# Patient Record
Sex: FEMALE | URBAN - METROPOLITAN AREA
[De-identification: names, ages, dates, MRNs, and addresses within clinical notes are randomized per-mention and may not be internally consistent; named-entity substitution may affect disease eponyms.]

---

## 2023-03-11 PROBLEM — F17.200 TOBACCO USE DISORDER: Status: ACTIVE | Noted: 2023-03-11

## 2023-03-11 PROBLEM — E78.5 HLD (HYPERLIPIDEMIA): Status: ACTIVE | Noted: 2023-03-11

## 2023-03-11 PROBLEM — F20.9 SCHIZOPHRENIA (MULTI): Status: ACTIVE | Noted: 2023-03-11

## 2023-03-11 PROBLEM — R63.4 WEIGHT LOSS: Status: ACTIVE | Noted: 2023-03-11

## 2023-03-11 PROBLEM — I10 HYPERTENSION: Status: ACTIVE | Noted: 2023-03-11

## 2023-03-11 PROBLEM — E88.09 HYPOALBUMINEMIA: Status: ACTIVE | Noted: 2023-03-11

## 2023-03-11 PROBLEM — F69 BEHAVIOR PROBLEM, ADULT: Status: ACTIVE | Noted: 2023-03-11

## 2023-03-11 PROBLEM — G24.01 NEUROLEPTIC-INDUCED TARDIVE DYSKINESIA: Status: ACTIVE | Noted: 2023-03-11

## 2023-03-11 PROBLEM — E53.8 VITAMIN B12 DEFICIENCY: Status: ACTIVE | Noted: 2023-03-11

## 2023-03-11 PROBLEM — J44.9 CHRONIC OBSTRUCTIVE AIRWAY DISEASE (MULTI): Status: ACTIVE | Noted: 2023-03-11

## 2023-03-11 PROBLEM — T43.505A NEUROLEPTIC-INDUCED TARDIVE DYSKINESIA: Status: ACTIVE | Noted: 2023-03-11

## 2023-03-11 PROBLEM — E55.9 VITAMIN D DEFICIENCY: Status: ACTIVE | Noted: 2023-03-11

## 2023-03-11 PROBLEM — R62.7 ADULT FAILURE TO THRIVE: Status: ACTIVE | Noted: 2023-03-11

## 2023-03-11 PROBLEM — I47.9 TACHYCARDIA, PAROXYSMAL (MULTI): Status: ACTIVE | Noted: 2023-03-11

## 2023-03-11 PROBLEM — K58.9 IBS (IRRITABLE BOWEL SYNDROME): Status: ACTIVE | Noted: 2023-03-11

## 2023-04-03 ENCOUNTER — NURSING HOME VISIT (OUTPATIENT)
Dept: POST ACUTE CARE | Facility: EXTERNAL LOCATION | Age: 78
End: 2023-04-03
Payer: COMMERCIAL

## 2023-04-03 VITALS
TEMPERATURE: 97.4 F | HEART RATE: 76 BPM | RESPIRATION RATE: 18 BRPM | OXYGEN SATURATION: 100 % | WEIGHT: 136 LBS | SYSTOLIC BLOOD PRESSURE: 126 MMHG | DIASTOLIC BLOOD PRESSURE: 70 MMHG | BODY MASS INDEX: 15.72 KG/M2

## 2023-04-03 DIAGNOSIS — G24.01 NEUROLEPTIC-INDUCED TARDIVE DYSKINESIA: Primary | ICD-10-CM

## 2023-04-03 DIAGNOSIS — I10 PRIMARY HYPERTENSION: ICD-10-CM

## 2023-04-03 DIAGNOSIS — F17.200 TOBACCO USE DISORDER: ICD-10-CM

## 2023-04-03 DIAGNOSIS — F20.9 SCHIZOPHRENIA, UNSPECIFIED TYPE (MULTI): ICD-10-CM

## 2023-04-03 DIAGNOSIS — J43.8 OTHER EMPHYSEMA (MULTI): ICD-10-CM

## 2023-04-03 DIAGNOSIS — T43.505A NEUROLEPTIC-INDUCED TARDIVE DYSKINESIA: Primary | ICD-10-CM

## 2023-04-03 PROCEDURE — 99309 SBSQ NF CARE MODERATE MDM 30: CPT | Performed by: NURSE PRACTITIONER

## 2023-04-03 NOTE — LETTER
Patient: Lupe Serrato  : 1945    Encounter Date: 2023    Subjective   Lupe Serrato is a 77 y.o. female Here for routine monthly visit.    HPI There are no new problems and multiple health problems have been reviewed.  The course has been unchanged.  The patient  is oriented..   There are no family members present during time of visit.    she is sitting up at side of bed,  denies any complaints when asked.  Eating and drinking well.   No concerns per staff.       Review of Systems   Constitutional:  Negative for chills, fatigue and fever.   Respiratory:  Negative for cough and shortness of breath.    Cardiovascular:  Negative for chest pain and palpitations.   Gastrointestinal:  Negative for abdominal pain, constipation, diarrhea, nausea and vomiting.   Genitourinary:  Negative for difficulty urinating.       Objective   /70   Pulse 76   Temp 36.3 °C (97.4 °F)   Resp 18   Wt 61.7 kg (136 lb)   SpO2 100%   BMI 15.72 kg/m²     Physical Exam  Constitutional:       General: She is not in acute distress.     Comments: thin   HENT:      Head: Normocephalic and atraumatic.   Eyes:      Conjunctiva/sclera: Conjunctivae normal.   Cardiovascular:      Rate and Rhythm: Normal rate and regular rhythm.   Pulmonary:      Effort: Pulmonary effort is normal. No respiratory distress.      Breath sounds: Normal breath sounds.      Comments: Diminished t/o  Abdominal:      General: Bowel sounds are normal. There is no distension.      Palpations: Abdomen is soft.      Tenderness: There is no abdominal tenderness.   Musculoskeletal:         General: Normal range of motion.      Right lower leg: No edema.      Left lower leg: No edema.   Skin:     General: Skin is warm and dry.   Neurological:      General: No focal deficit present.      Mental Status: She is alert and oriented to person, place, and time.   Psychiatric:         Mood and Affect: Mood normal.         Behavior: Behavior normal.          Assessment/Plan   Problem List Items Addressed This Visit          Nervous    Neuroleptic-induced tardive dyskinesia - Primary     Follow up with psychiatry as scheduled.              Respiratory    Chronic obstructive airway disease (CMS/HCC)     no symptoms of acute exacerbation.  staff to monitor and notify for any changes.              Circulatory    Hypertension     Not currently on meds, continue to montior and initiate meds  based on clinical course            Other    Schizophrenia (CMS/HCC)     Managed by psychiatry in the facility         Tobacco use disorder     Declines smoking cessation strategies          continue with supportive and restorative care  Shelia has requested no labs for monitoring purposes which will be honored.       Electronically Signed By: BETH Victor   4/7/23  6:33 PM

## 2023-04-03 NOTE — PROGRESS NOTES
Subjective   Lupe Serrato is a 77 y.o. female Here for routine monthly visit.    HPI There are no new problems and multiple health problems have been reviewed.  The course has been unchanged.  The patient  is oriented..   There are no family members present during time of visit.    she is sitting up at side of bed,  denies any complaints when asked.  Eating and drinking well.   No concerns per staff.       Review of Systems   Constitutional:  Negative for chills, fatigue and fever.   Respiratory:  Negative for cough and shortness of breath.    Cardiovascular:  Negative for chest pain and palpitations.   Gastrointestinal:  Negative for abdominal pain, constipation, diarrhea, nausea and vomiting.   Genitourinary:  Negative for difficulty urinating.       Objective   /70   Pulse 76   Temp 36.3 °C (97.4 °F)   Resp 18   Wt 61.7 kg (136 lb)   SpO2 100%   BMI 15.72 kg/m²     Physical Exam  Constitutional:       General: She is not in acute distress.     Comments: thin   HENT:      Head: Normocephalic and atraumatic.   Eyes:      Conjunctiva/sclera: Conjunctivae normal.   Cardiovascular:      Rate and Rhythm: Normal rate and regular rhythm.   Pulmonary:      Effort: Pulmonary effort is normal. No respiratory distress.      Breath sounds: Normal breath sounds.      Comments: Diminished t/o  Abdominal:      General: Bowel sounds are normal. There is no distension.      Palpations: Abdomen is soft.      Tenderness: There is no abdominal tenderness.   Musculoskeletal:         General: Normal range of motion.      Right lower leg: No edema.      Left lower leg: No edema.   Skin:     General: Skin is warm and dry.   Neurological:      General: No focal deficit present.      Mental Status: She is alert and oriented to person, place, and time.   Psychiatric:         Mood and Affect: Mood normal.         Behavior: Behavior normal.         Assessment/Plan   Problem List Items Addressed This Visit          Nervous     Neuroleptic-induced tardive dyskinesia - Primary     Follow up with psychiatry as scheduled.              Respiratory    Chronic obstructive airway disease (CMS/HCC)     no symptoms of acute exacerbation.  staff to monitor and notify for any changes.              Circulatory    Hypertension     Not currently on meds, continue to montior and initiate meds  based on clinical course            Other    Schizophrenia (CMS/HCC)     Managed by psychiatry in the facility         Tobacco use disorder     Declines smoking cessation strategies          continue with supportive and restorative care  Shelia has requested no labs for monitoring purposes which will be honored.

## 2023-04-07 ASSESSMENT — ENCOUNTER SYMPTOMS
PALPITATIONS: 0
DIARRHEA: 0
SHORTNESS OF BREATH: 0
NAUSEA: 0
ABDOMINAL PAIN: 0
FEVER: 0
DIFFICULTY URINATING: 0
FATIGUE: 0
VOMITING: 0
CHILLS: 0
CONSTIPATION: 0
COUGH: 0

## 2023-04-27 ENCOUNTER — NURSING HOME VISIT (OUTPATIENT)
Dept: POST ACUTE CARE | Facility: EXTERNAL LOCATION | Age: 78
End: 2023-04-27
Payer: COMMERCIAL

## 2023-04-27 DIAGNOSIS — F20.9 SCHIZOPHRENIA, UNSPECIFIED TYPE (MULTI): ICD-10-CM

## 2023-04-27 DIAGNOSIS — G24.01 NEUROLEPTIC-INDUCED TARDIVE DYSKINESIA: Primary | ICD-10-CM

## 2023-04-27 DIAGNOSIS — F17.200 TOBACCO USE DISORDER: ICD-10-CM

## 2023-04-27 DIAGNOSIS — T43.505A NEUROLEPTIC-INDUCED TARDIVE DYSKINESIA: Primary | ICD-10-CM

## 2023-04-27 PROCEDURE — 99308 SBSQ NF CARE LOW MDM 20: CPT | Performed by: INTERNAL MEDICINE

## 2023-04-27 NOTE — LETTER
Patient: Lupe Serrato  : 1945    Encounter Date: 2023    Subjective  Patient ID: Lupe Serrato is a 77 y.o. female who is long term resident being seen and evaluated for multiple medical problems.    HPI   This 77-year-old female patient is resting comfortably in her bed in no distress.  She has no complaints of pain or shortness of breath me at this time.  Nursing has no new adverse events reported.    Current high risk medication:  Benztropine  Potassium  Seroquel    Laboratory examinations were ordered by the psychiatric nurse practitioner but the patient probably refused them as they appear not to been completed.    Review of Systems   Constitutional:  Negative for chills, diaphoresis and fever.   Respiratory:  Negative for cough and shortness of breath.    Cardiovascular:  Negative for chest pain and leg swelling.   Gastrointestinal:  Negative for constipation, diarrhea, nausea and vomiting.   Musculoskeletal:  Negative for joint swelling and myalgias.       Objective  /70   Pulse 82   Temp 36.1 °C (96.9 °F)   Resp 18   Wt 60.3 kg (133 lb)   SpO2 97%   BMI 15.37 kg/m²     Physical Exam  Vitals reviewed.   Constitutional:       General: She is not in acute distress.     Appearance: She is not ill-appearing.   Cardiovascular:      Rate and Rhythm: Normal rate and regular rhythm.      Pulses: Normal pulses.      Heart sounds:      No gallop.   Pulmonary:      Breath sounds: Normal breath sounds. No wheezing, rhonchi or rales.   Abdominal:      General: Abdomen is flat. Bowel sounds are normal.      Palpations: Abdomen is soft.      Tenderness: There is no guarding or rebound.   Musculoskeletal:      Right lower leg: No edema.      Left lower leg: No edema.   Neurological:      Comments: The patient has more noticeable bilateral hand resting tremor consistent with extraparametal symptoms.         Assessment/Plan  Problem List Items Addressed This Visit          Nervous     Neuroleptic-induced tardive dyskinesia - Primary       Other    Schizophrenia (CMS/HCC)    Tobacco use disorder       A.  We will continue with restorative and supportive care as the patient tolerates    B.  Laboratory examinations will be deferred as the patient has categorically refused all laboratory and other healthcare testing.    C.  Appreciate the input of psychiatry and the cares at this patient with chronic schizophrenia and apparently worsening extraparametal symptoms    D.  The patient's prognosis is guarded.      Electronically Signed By: Devonte Mcclure MD   5/1/23  5:01 PM

## 2023-04-30 VITALS
DIASTOLIC BLOOD PRESSURE: 70 MMHG | BODY MASS INDEX: 15.37 KG/M2 | OXYGEN SATURATION: 97 % | HEART RATE: 82 BPM | WEIGHT: 133 LBS | RESPIRATION RATE: 18 BRPM | TEMPERATURE: 96.9 F | SYSTOLIC BLOOD PRESSURE: 124 MMHG

## 2023-04-30 NOTE — PROGRESS NOTES
Subjective   Patient ID: Lupe Serrato is a 77 y.o. female who is long term resident being seen and evaluated for multiple medical problems.    HPI   This 77-year-old female patient is resting comfortably in her bed in no distress.  She has no complaints of pain or shortness of breath me at this time.  Nursing has no new adverse events reported.    Current high risk medication:  Benztropine  Potassium  Seroquel    Laboratory examinations were ordered by the psychiatric nurse practitioner but the patient probably refused them as they appear not to been completed.    Review of Systems   Constitutional:  Negative for chills, diaphoresis and fever.   Respiratory:  Negative for cough and shortness of breath.    Cardiovascular:  Negative for chest pain and leg swelling.   Gastrointestinal:  Negative for constipation, diarrhea, nausea and vomiting.   Musculoskeletal:  Negative for joint swelling and myalgias.       Objective   /70   Pulse 82   Temp 36.1 °C (96.9 °F)   Resp 18   Wt 60.3 kg (133 lb)   SpO2 97%   BMI 15.37 kg/m²     Physical Exam  Vitals reviewed.   Constitutional:       General: She is not in acute distress.     Appearance: She is not ill-appearing.   Cardiovascular:      Rate and Rhythm: Normal rate and regular rhythm.      Pulses: Normal pulses.      Heart sounds:      No gallop.   Pulmonary:      Breath sounds: Normal breath sounds. No wheezing, rhonchi or rales.   Abdominal:      General: Abdomen is flat. Bowel sounds are normal.      Palpations: Abdomen is soft.      Tenderness: There is no guarding or rebound.   Musculoskeletal:      Right lower leg: No edema.      Left lower leg: No edema.   Neurological:      Comments: The patient has more noticeable bilateral hand resting tremor consistent with extraparametal symptoms.         Assessment/Plan   Problem List Items Addressed This Visit          Nervous    Neuroleptic-induced tardive dyskinesia - Primary       Other    Schizophrenia  (CMS/[facility])    Tobacco use disorder       A.  We will continue with restorative and supportive care as the patient tolerates    B.  Laboratory examinations will be deferred as the patient has categorically refused all laboratory and other healthcare testing.    C.  Appreciate the input of psychiatry and the cares at this patient with chronic schizophrenia and apparently worsening extraparametal symptoms    D.  The patient's prognosis is guarded.

## 2023-05-01 ASSESSMENT — ENCOUNTER SYMPTOMS
DIARRHEA: 0
VOMITING: 0
DIAPHORESIS: 0
SHORTNESS OF BREATH: 0
JOINT SWELLING: 0
CHILLS: 0
NAUSEA: 0
FEVER: 0
CONSTIPATION: 0
MYALGIAS: 0
COUGH: 0

## 2023-06-06 ENCOUNTER — NURSING HOME VISIT (OUTPATIENT)
Dept: POST ACUTE CARE | Facility: EXTERNAL LOCATION | Age: 78
End: 2023-06-06
Payer: COMMERCIAL

## 2023-06-06 VITALS
RESPIRATION RATE: 18 BRPM | DIASTOLIC BLOOD PRESSURE: 67 MMHG | HEART RATE: 76 BPM | BODY MASS INDEX: 15.72 KG/M2 | WEIGHT: 136 LBS | SYSTOLIC BLOOD PRESSURE: 121 MMHG | TEMPERATURE: 97.8 F | OXYGEN SATURATION: 97 %

## 2023-06-06 DIAGNOSIS — T43.505A NEUROLEPTIC-INDUCED TARDIVE DYSKINESIA: ICD-10-CM

## 2023-06-06 DIAGNOSIS — J43.8 OTHER EMPHYSEMA (MULTI): ICD-10-CM

## 2023-06-06 DIAGNOSIS — F17.200 TOBACCO USE DISORDER: ICD-10-CM

## 2023-06-06 DIAGNOSIS — F20.9 SCHIZOPHRENIA, UNSPECIFIED TYPE (MULTI): ICD-10-CM

## 2023-06-06 DIAGNOSIS — G24.01 NEUROLEPTIC-INDUCED TARDIVE DYSKINESIA: ICD-10-CM

## 2023-06-06 DIAGNOSIS — I10 PRIMARY HYPERTENSION: Primary | ICD-10-CM

## 2023-06-06 PROCEDURE — 99308 SBSQ NF CARE LOW MDM 20: CPT | Performed by: INTERNAL MEDICINE

## 2023-06-06 NOTE — LETTER
Patient: Lupe Serrato  : 1945    Encounter Date: 2023    Subjective  Patient ID: Lupe Serrato is a 77 y.o. female who is long term resident being seen and evaluated for multiple medical problems.    HPI   This 77-year-old female patient is up ambulatory in her room in no distress.  She has no complaints of shortness of breath or pain for me.  Nursing has no new adverse events reported to me.  Of note the patient did agree to get lab draw which is quite unusual.    Current high risk medication:  Benztropine  Potassium chloride  Seroquel    Laboratory examination from 2023:  Potassium 4.3  Bicarbonate 26  Creatinine 0.5  Abdomen 3.0  Liver injury test normal  Hemoglobin 11.6  Folic acid 10.2  Vitamin B12 410  Vitamin D 47    Review of Systems   Constitutional:  Negative for chills, diaphoresis and fever.   Respiratory:  Negative for cough and shortness of breath.    Cardiovascular:  Negative for chest pain and leg swelling.   Gastrointestinal:  Negative for constipation, diarrhea, nausea and vomiting.   Musculoskeletal:  Negative for joint swelling and myalgias.       Objective  /67   Pulse 76   Temp 36.6 °C (97.8 °F)   Resp 18   Wt 61.7 kg (136 lb)   SpO2 97%   BMI 15.72 kg/m²     Physical Exam  Vitals reviewed.   Constitutional:       General: She is not in acute distress.     Appearance: She is not ill-appearing.   Cardiovascular:      Rate and Rhythm: Normal rate and regular rhythm.      Pulses: Normal pulses.      Heart sounds:      No gallop.   Pulmonary:      Breath sounds: Normal breath sounds. No wheezing, rhonchi or rales.   Abdominal:      General: Abdomen is flat. Bowel sounds are normal.      Palpations: Abdomen is soft.      Tenderness: There is no guarding or rebound.   Musculoskeletal:      Right lower leg: No edema.      Left lower leg: No edema.   Neurological:      Comments: The patient has more noticeable bilateral hand resting tremor consistent with  extraparametal symptoms.         Assessment/Plan  Problem List Items Addressed This Visit          Nervous    Neuroleptic-induced tardive dyskinesia       Respiratory    Chronic obstructive airway disease (CMS/HCC)       Circulatory    Hypertension - Primary       Other    Schizophrenia (CMS/HCC)    Tobacco use disorder       A.  We will continue with restorative and supportive care as patient tolerates    B.  Laboratory examinations will next be due in October 2023 or as needed    C.  The patient's prognosis is fair-2-guarded.      Electronically Signed By: Devonte Mcclure MD   6/7/23  6:11 PM

## 2023-06-06 NOTE — PROGRESS NOTES
Subjective   Patient ID: Lupe Serrato is a 77 y.o. female who is long term resident being seen and evaluated for multiple medical problems.    HPI   This 77-year-old female patient is up ambulatory in her room in no distress.  She has no complaints of shortness of breath or pain for me.  Nursing has no new adverse events reported to me.  Of note the patient did agree to get lab draw which is quite unusual.    Current high risk medication:  Benztropine  Potassium chloride  Seroquel    Laboratory examination from April 2023:  Potassium 4.3  Bicarbonate 26  Creatinine 0.5  Abdomen 3.0  Liver injury test normal  Hemoglobin 11.6  Folic acid 10.2  Vitamin B12 410  Vitamin D 47    Review of Systems   Constitutional:  Negative for chills, diaphoresis and fever.   Respiratory:  Negative for cough and shortness of breath.    Cardiovascular:  Negative for chest pain and leg swelling.   Gastrointestinal:  Negative for constipation, diarrhea, nausea and vomiting.   Musculoskeletal:  Negative for joint swelling and myalgias.       Objective   /67   Pulse 76   Temp 36.6 °C (97.8 °F)   Resp 18   Wt 61.7 kg (136 lb)   SpO2 97%   BMI 15.72 kg/m²     Physical Exam  Vitals reviewed.   Constitutional:       General: She is not in acute distress.     Appearance: She is not ill-appearing.   Cardiovascular:      Rate and Rhythm: Normal rate and regular rhythm.      Pulses: Normal pulses.      Heart sounds:      No gallop.   Pulmonary:      Breath sounds: Normal breath sounds. No wheezing, rhonchi or rales.   Abdominal:      General: Abdomen is flat. Bowel sounds are normal.      Palpations: Abdomen is soft.      Tenderness: There is no guarding or rebound.   Musculoskeletal:      Right lower leg: No edema.      Left lower leg: No edema.   Neurological:      Comments: The patient has more noticeable bilateral hand resting tremor consistent with extraparametal symptoms.         Assessment/Plan   Problem List Items Addressed  This Visit          Nervous    Neuroleptic-induced tardive dyskinesia       Respiratory    Chronic obstructive airway disease (CMS/HCC)       Circulatory    Hypertension - Primary       Other    Schizophrenia (CMS/HCC)    Tobacco use disorder       A.  We will continue with restorative and supportive care as patient tolerates    B.  Laboratory examinations will next be due in October 2023 or as needed    C.  The patient's prognosis is fair-2-guarded.

## 2023-06-07 ASSESSMENT — ENCOUNTER SYMPTOMS
CONSTIPATION: 0
DIARRHEA: 0
NAUSEA: 0
MYALGIAS: 0
SHORTNESS OF BREATH: 0
JOINT SWELLING: 0
CHILLS: 0
VOMITING: 0
COUGH: 0
FEVER: 0
DIAPHORESIS: 0

## 2023-06-22 ENCOUNTER — NURSING HOME VISIT (OUTPATIENT)
Dept: POST ACUTE CARE | Facility: EXTERNAL LOCATION | Age: 78
End: 2023-06-22
Payer: COMMERCIAL

## 2023-06-22 VITALS
SYSTOLIC BLOOD PRESSURE: 125 MMHG | HEART RATE: 75 BPM | WEIGHT: 136 LBS | BODY MASS INDEX: 15.72 KG/M2 | OXYGEN SATURATION: 97 % | DIASTOLIC BLOOD PRESSURE: 65 MMHG | TEMPERATURE: 97.4 F | RESPIRATION RATE: 18 BRPM

## 2023-06-22 DIAGNOSIS — F20.9 SCHIZOPHRENIA, UNSPECIFIED TYPE (MULTI): ICD-10-CM

## 2023-06-22 DIAGNOSIS — I10 PRIMARY HYPERTENSION: ICD-10-CM

## 2023-06-22 DIAGNOSIS — T43.505A NEUROLEPTIC-INDUCED TARDIVE DYSKINESIA: ICD-10-CM

## 2023-06-22 DIAGNOSIS — J43.8 OTHER EMPHYSEMA (MULTI): Primary | ICD-10-CM

## 2023-06-22 DIAGNOSIS — G24.01 NEUROLEPTIC-INDUCED TARDIVE DYSKINESIA: ICD-10-CM

## 2023-06-22 PROCEDURE — 99309 SBSQ NF CARE MODERATE MDM 30: CPT | Performed by: NURSE PRACTITIONER

## 2023-06-22 ASSESSMENT — ENCOUNTER SYMPTOMS
DIARRHEA: 0
PALPITATIONS: 0
VOMITING: 0
NAUSEA: 0
SHORTNESS OF BREATH: 0
CHILLS: 0
DIFFICULTY URINATING: 0
ABDOMINAL PAIN: 0
COUGH: 0
FEVER: 0
FATIGUE: 0
CONSTIPATION: 0

## 2023-06-22 NOTE — PROGRESS NOTES
Subjective   Lupe Serrato is a 77 y.o. female Here for routine monthly visit.    HPI There are no new problems and multiple health problems have been reviewed.  The course has been unchanged.  The patient  is oriented.  There are no family members present during time of visit.    she is sitting up at side of bed,  denies any complaints when asked.  Eating and drinking well.   No concerns per staff.       Review of Systems   Constitutional:  Negative for chills, fatigue and fever.   Respiratory:  Negative for cough and shortness of breath.    Cardiovascular:  Negative for chest pain and palpitations.   Gastrointestinal:  Negative for abdominal pain, constipation, diarrhea, nausea and vomiting.   Genitourinary:  Negative for difficulty urinating.       Objective   /65   Pulse 75   Temp 36.3 °C (97.4 °F)   Resp 18   Wt 61.7 kg (136 lb)   SpO2 97%   BMI 15.72 kg/m²     Physical Exam  Constitutional:       General: She is not in acute distress.     Comments: thin   HENT:      Head: Normocephalic and atraumatic.   Eyes:      Conjunctiva/sclera: Conjunctivae normal.   Cardiovascular:      Rate and Rhythm: Normal rate and regular rhythm.   Pulmonary:      Effort: Pulmonary effort is normal. No respiratory distress.      Breath sounds: Normal breath sounds.      Comments: Diminished t/o  Abdominal:      General: Bowel sounds are normal. There is no distension.      Palpations: Abdomen is soft.      Tenderness: There is no abdominal tenderness.   Musculoskeletal:         General: Normal range of motion.      Right lower leg: No edema.      Left lower leg: No edema.   Skin:     General: Skin is warm and dry.   Neurological:      General: No focal deficit present.      Mental Status: She is alert and oriented to person, place, and time.   Psychiatric:         Mood and Affect: Mood normal.         Behavior: Behavior normal.         Assessment/Plan   Problem List Items Addressed This Visit          Nervous     Neuroleptic-induced tardive dyskinesia     Follow up with psychiatry as scheduled.              Respiratory    Chronic obstructive airway disease (CMS/HCC) - Primary     no symptoms of acute exacerbation.  staff to monitor and notify for any changes.              Circulatory    Hypertension     Not currently on meds, continue to montior and initiate meds  based on clinical course            Other    Schizophrenia (CMS/HCC)     Managed by psychiatry in the facility          continue with supportive and restorative care  Shelia agreed to labs 4/23 that were ordered through psychiatry, she has previously declined all labs, if she decides to continue to have labs they will be due 10/23

## 2023-06-22 NOTE — LETTER
Patient: Lupe Serrato  : 1945    Encounter Date: 2023    Subjective  Lupe Serrato is a 77 y.o. female Here for routine monthly visit.    HPI There are no new problems and multiple health problems have been reviewed.  The course has been unchanged.  The patient  is oriented.  There are no family members present during time of visit.    she is sitting up at side of bed,  denies any complaints when asked.  Eating and drinking well.   No concerns per staff.       Review of Systems   Constitutional:  Negative for chills, fatigue and fever.   Respiratory:  Negative for cough and shortness of breath.    Cardiovascular:  Negative for chest pain and palpitations.   Gastrointestinal:  Negative for abdominal pain, constipation, diarrhea, nausea and vomiting.   Genitourinary:  Negative for difficulty urinating.       Objective  /65   Pulse 75   Temp 36.3 °C (97.4 °F)   Resp 18   Wt 61.7 kg (136 lb)   SpO2 97%   BMI 15.72 kg/m²     Physical Exam  Constitutional:       General: She is not in acute distress.     Comments: thin   HENT:      Head: Normocephalic and atraumatic.   Eyes:      Conjunctiva/sclera: Conjunctivae normal.   Cardiovascular:      Rate and Rhythm: Normal rate and regular rhythm.   Pulmonary:      Effort: Pulmonary effort is normal. No respiratory distress.      Breath sounds: Normal breath sounds.      Comments: Diminished t/o  Abdominal:      General: Bowel sounds are normal. There is no distension.      Palpations: Abdomen is soft.      Tenderness: There is no abdominal tenderness.   Musculoskeletal:         General: Normal range of motion.      Right lower leg: No edema.      Left lower leg: No edema.   Skin:     General: Skin is warm and dry.   Neurological:      General: No focal deficit present.      Mental Status: She is alert and oriented to person, place, and time.   Psychiatric:         Mood and Affect: Mood normal.         Behavior: Behavior normal.          Assessment/Plan  Problem List Items Addressed This Visit          Nervous    Neuroleptic-induced tardive dyskinesia     Follow up with psychiatry as scheduled.              Respiratory    Chronic obstructive airway disease (CMS/HCC) - Primary     no symptoms of acute exacerbation.  staff to monitor and notify for any changes.              Circulatory    Hypertension     Not currently on meds, continue to montior and initiate meds  based on clinical course            Other    Schizophrenia (CMS/HCC)     Managed by psychiatry in the facility          continue with supportive and restorative care  Shelia agreed to labs 4/23 that were ordered through psychiatry, she has previously declined all labs, if she decides to continue to have labs they will be due 10/23      Electronically Signed By: BETH Victor   6/22/23  4:21 PM

## 2023-07-06 ENCOUNTER — NURSING HOME VISIT (OUTPATIENT)
Dept: POST ACUTE CARE | Facility: EXTERNAL LOCATION | Age: 78
End: 2023-07-06
Payer: COMMERCIAL

## 2023-07-06 VITALS
RESPIRATION RATE: 18 BRPM | HEART RATE: 81 BPM | BODY MASS INDEX: 15.37 KG/M2 | TEMPERATURE: 97.4 F | WEIGHT: 133 LBS | SYSTOLIC BLOOD PRESSURE: 136 MMHG | DIASTOLIC BLOOD PRESSURE: 86 MMHG | OXYGEN SATURATION: 97 %

## 2023-07-06 DIAGNOSIS — J43.8 OTHER EMPHYSEMA (MULTI): Primary | ICD-10-CM

## 2023-07-06 DIAGNOSIS — G24.01 NEUROLEPTIC-INDUCED TARDIVE DYSKINESIA: ICD-10-CM

## 2023-07-06 DIAGNOSIS — F20.9 SCHIZOPHRENIA, UNSPECIFIED TYPE (MULTI): ICD-10-CM

## 2023-07-06 DIAGNOSIS — I10 PRIMARY HYPERTENSION: ICD-10-CM

## 2023-07-06 DIAGNOSIS — F17.200 TOBACCO USE DISORDER: ICD-10-CM

## 2023-07-06 DIAGNOSIS — T43.505A NEUROLEPTIC-INDUCED TARDIVE DYSKINESIA: ICD-10-CM

## 2023-07-06 PROCEDURE — 99308 SBSQ NF CARE LOW MDM 20: CPT | Performed by: INTERNAL MEDICINE

## 2023-07-06 NOTE — PROGRESS NOTES
Subjective   Patient ID: Lupe Serrato is a 77 y.o. female who is long term resident being seen and evaluated for multiple medical problems.    HPI   This 77-year-old female patient is resting comfortably in her room in no distress.  She has no complaints of pain or shortness of breath me at this time.  Nursing has no new adverse events reported to me.    Current high risk medication:  Benztropine  Potassium  Seroquel    Laboratory examination from April 2023 of been reviewed in detail by me.    Review of Systems   Constitutional:  Negative for chills, fatigue and fever.   Respiratory:  Negative for cough and shortness of breath.    Cardiovascular:  Negative for chest pain and palpitations.   Gastrointestinal:  Negative for abdominal pain, constipation, diarrhea, nausea and vomiting.   Genitourinary:  Negative for difficulty urinating.       Objective   /86   Pulse 81   Temp 36.3 °C (97.4 °F)   Resp 18   Wt 60.3 kg (133 lb)   SpO2 97%   BMI 15.37 kg/m²     Physical Exam  Constitutional:       General: She is not in acute distress.     Comments: thin   HENT:      Head: Normocephalic and atraumatic.      Mouth/Throat:      Mouth: Mucous membranes are moist.   Eyes:      Conjunctiva/sclera: Conjunctivae normal.   Cardiovascular:      Rate and Rhythm: Normal rate and regular rhythm.   Pulmonary:      Effort: Pulmonary effort is normal. No respiratory distress.      Breath sounds: Normal breath sounds.      Comments: Diminished t/o  Abdominal:      General: Bowel sounds are normal. There is no distension.      Palpations: Abdomen is soft.      Tenderness: There is no abdominal tenderness.   Musculoskeletal:         General: Normal range of motion.      Right lower leg: No edema.      Left lower leg: No edema.   Skin:     General: Skin is warm and dry.   Neurological:      General: No focal deficit present.      Mental Status: She is alert and oriented to person, place, and time.   Psychiatric:         Mood  and Affect: Mood normal.         Behavior: Behavior normal.         Assessment/Plan   Problem List Items Addressed This Visit       Chronic obstructive airway disease (CMS/HCC) - Primary    Hypertension    Neuroleptic-induced tardive dyskinesia    Schizophrenia (CMS/HCC)    Tobacco use disorder     A.  We will continue with restorative and supportive care as patient tolerates    B.  Laboratory examinations will next be due in October 2023 if the patient agrees    C.  The patient's prognosis is guarded.

## 2023-07-06 NOTE — LETTER
Patient: Lupe Serrato  : 1945    Encounter Date: 2023    Subjective  Patient ID: Luep Serrato is a 77 y.o. female who is long term resident being seen and evaluated for multiple medical problems.    HPI   This 77-year-old female patient is resting comfortably in her room in no distress.  She has no complaints of pain or shortness of breath me at this time.  Nursing has no new adverse events reported to me.    Current high risk medication:  Benztropine  Potassium  Seroquel    Laboratory examination from 2023 of been reviewed in detail by me.    Review of Systems   Constitutional:  Negative for chills, fatigue and fever.   Respiratory:  Negative for cough and shortness of breath.    Cardiovascular:  Negative for chest pain and palpitations.   Gastrointestinal:  Negative for abdominal pain, constipation, diarrhea, nausea and vomiting.   Genitourinary:  Negative for difficulty urinating.       Objective  /86   Pulse 81   Temp 36.3 °C (97.4 °F)   Resp 18   Wt 60.3 kg (133 lb)   SpO2 97%   BMI 15.37 kg/m²     Physical Exam  Constitutional:       General: She is not in acute distress.     Comments: thin   HENT:      Head: Normocephalic and atraumatic.      Mouth/Throat:      Mouth: Mucous membranes are moist.   Eyes:      Conjunctiva/sclera: Conjunctivae normal.   Cardiovascular:      Rate and Rhythm: Normal rate and regular rhythm.   Pulmonary:      Effort: Pulmonary effort is normal. No respiratory distress.      Breath sounds: Normal breath sounds.      Comments: Diminished t/o  Abdominal:      General: Bowel sounds are normal. There is no distension.      Palpations: Abdomen is soft.      Tenderness: There is no abdominal tenderness.   Musculoskeletal:         General: Normal range of motion.      Right lower leg: No edema.      Left lower leg: No edema.   Skin:     General: Skin is warm and dry.   Neurological:      General: No focal deficit present.      Mental Status: She is  alert and oriented to person, place, and time.   Psychiatric:         Mood and Affect: Mood normal.         Behavior: Behavior normal.         Assessment/Plan  Problem List Items Addressed This Visit       Chronic obstructive airway disease (CMS/HCC) - Primary    Hypertension    Neuroleptic-induced tardive dyskinesia    Schizophrenia (CMS/HCC)    Tobacco use disorder     A.  We will continue with restorative and supportive care as patient tolerates    B.  Laboratory examinations will next be due in October 2023 if the patient agrees    C.  The patient's prognosis is guarded.        Electronically Signed By: Devonte Mcclure MD   7/10/23  4:11 PM

## 2023-07-10 ASSESSMENT — ENCOUNTER SYMPTOMS
COUGH: 0
DIFFICULTY URINATING: 0
CHILLS: 0
SHORTNESS OF BREATH: 0
VOMITING: 0
FEVER: 0
CONSTIPATION: 0
NAUSEA: 0
DIARRHEA: 0
ABDOMINAL PAIN: 0
PALPITATIONS: 0
FATIGUE: 0

## 2023-08-04 ENCOUNTER — NURSING HOME VISIT (OUTPATIENT)
Dept: POST ACUTE CARE | Facility: EXTERNAL LOCATION | Age: 78
End: 2023-08-04
Payer: COMMERCIAL

## 2023-08-04 VITALS
DIASTOLIC BLOOD PRESSURE: 82 MMHG | TEMPERATURE: 98.1 F | RESPIRATION RATE: 18 BRPM | BODY MASS INDEX: 15.49 KG/M2 | WEIGHT: 134 LBS | OXYGEN SATURATION: 94 % | SYSTOLIC BLOOD PRESSURE: 120 MMHG | HEART RATE: 80 BPM

## 2023-08-04 DIAGNOSIS — F20.9 SCHIZOPHRENIA, UNSPECIFIED TYPE (MULTI): ICD-10-CM

## 2023-08-04 DIAGNOSIS — G24.01 NEUROLEPTIC-INDUCED TARDIVE DYSKINESIA: ICD-10-CM

## 2023-08-04 DIAGNOSIS — T43.505A NEUROLEPTIC-INDUCED TARDIVE DYSKINESIA: ICD-10-CM

## 2023-08-04 DIAGNOSIS — J43.8 OTHER EMPHYSEMA (MULTI): ICD-10-CM

## 2023-08-04 DIAGNOSIS — I10 PRIMARY HYPERTENSION: Primary | ICD-10-CM

## 2023-08-04 DIAGNOSIS — E78.5 HYPERLIPIDEMIA, UNSPECIFIED HYPERLIPIDEMIA TYPE: ICD-10-CM

## 2023-08-04 DIAGNOSIS — F17.200 TOBACCO USE DISORDER: ICD-10-CM

## 2023-08-04 PROCEDURE — 99308 SBSQ NF CARE LOW MDM 20: CPT | Performed by: INTERNAL MEDICINE

## 2023-08-04 NOTE — PROGRESS NOTES
Subjective   Patient ID: Lupe Serrato is a 78 y.o. female who is long term resident being seen and evaluated for multiple medical problems.    HPI   This 78-year-old female patient is resting quietly in her bed in no distress.  She has no complaints as per her usual.  Nursing has no new adverse events fortunately.    Current high risk medication:  Benztropine  Seroquel    Laboratory examination from April 2023:  Potassium 4.3  Bicarbonate 26  Creatinine 0.5  Albumin 3.0  Laboratory test normal  Hemoglobin 11.6  Folic acid 10.2  Vitamin B12 410  Vitamin D 47    Review of Systems   Constitutional:  Negative for chills, fatigue and fever.   Respiratory:  Negative for cough and shortness of breath.    Cardiovascular:  Negative for chest pain and palpitations.   Gastrointestinal:  Negative for abdominal pain, constipation, diarrhea, nausea and vomiting.   Genitourinary:  Negative for difficulty urinating.       Objective   /82   Pulse 80   Temp 36.7 °C (98.1 °F)   Resp 18   Wt 60.8 kg (134 lb)   SpO2 94%   BMI 15.49 kg/m²     Physical Exam  Constitutional:       General: She is not in acute distress.     Comments: thin   HENT:      Head: Normocephalic and atraumatic.      Mouth/Throat:      Mouth: Mucous membranes are moist.   Eyes:      Conjunctiva/sclera: Conjunctivae normal.   Cardiovascular:      Rate and Rhythm: Normal rate and regular rhythm.   Pulmonary:      Effort: Pulmonary effort is normal. No respiratory distress.      Breath sounds: Normal breath sounds.      Comments: Diminished t/o  Abdominal:      General: Bowel sounds are normal. There is no distension.      Palpations: Abdomen is soft.      Tenderness: There is no abdominal tenderness.   Musculoskeletal:         General: Normal range of motion.      Right lower leg: No edema.      Left lower leg: No edema.   Skin:     General: Skin is warm and dry.   Neurological:      General: No focal deficit present.      Mental Status: She is alert  and oriented to person, place, and time.   Psychiatric:         Mood and Affect: Mood normal.         Behavior: Behavior normal.         Assessment/Plan   Problem List Items Addressed This Visit       Chronic obstructive airway disease (CMS/HCC)    HLD (hyperlipidemia)    Hypertension - Primary    Neuroleptic-induced tardive dyskinesia    Schizophrenia (CMS/HCC)    Tobacco use disorder     8.  We will continue with restorative and supportive cares please tolerate    B.  Laboratory semination is elected in October 2023 or as needed    C.  The patient's prognosis is guarded.

## 2023-08-04 NOTE — LETTER
Patient: Lupe Serrato  : 1945    Encounter Date: 2023    Subjective  Patient ID: Lupe Serrato is a 78 y.o. female who is long term resident being seen and evaluated for multiple medical problems.    HPI   This 78-year-old female patient is resting quietly in her bed in no distress.  She has no complaints as per her usual.  Nursing has no new adverse events fortunately.    Current high risk medication:  Benztropine  Seroquel    Laboratory examination from 2023:  Potassium 4.3  Bicarbonate 26  Creatinine 0.5  Albumin 3.0  Laboratory test normal  Hemoglobin 11.6  Folic acid 10.2  Vitamin B12 410  Vitamin D 47    Review of Systems   Constitutional:  Negative for chills, fatigue and fever.   Respiratory:  Negative for cough and shortness of breath.    Cardiovascular:  Negative for chest pain and palpitations.   Gastrointestinal:  Negative for abdominal pain, constipation, diarrhea, nausea and vomiting.   Genitourinary:  Negative for difficulty urinating.       Objective  /82   Pulse 80   Temp 36.7 °C (98.1 °F)   Resp 18   Wt 60.8 kg (134 lb)   SpO2 94%   BMI 15.49 kg/m²     Physical Exam  Constitutional:       General: She is not in acute distress.     Comments: thin   HENT:      Head: Normocephalic and atraumatic.      Mouth/Throat:      Mouth: Mucous membranes are moist.   Eyes:      Conjunctiva/sclera: Conjunctivae normal.   Cardiovascular:      Rate and Rhythm: Normal rate and regular rhythm.   Pulmonary:      Effort: Pulmonary effort is normal. No respiratory distress.      Breath sounds: Normal breath sounds.      Comments: Diminished t/o  Abdominal:      General: Bowel sounds are normal. There is no distension.      Palpations: Abdomen is soft.      Tenderness: There is no abdominal tenderness.   Musculoskeletal:         General: Normal range of motion.      Right lower leg: No edema.      Left lower leg: No edema.   Skin:     General: Skin is warm and dry.   Neurological:       General: No focal deficit present.      Mental Status: She is alert and oriented to person, place, and time.   Psychiatric:         Mood and Affect: Mood normal.         Behavior: Behavior normal.         Assessment/Plan  Problem List Items Addressed This Visit       Chronic obstructive airway disease (CMS/HCC)    HLD (hyperlipidemia)    Hypertension - Primary    Neuroleptic-induced tardive dyskinesia    Schizophrenia (CMS/HCC)    Tobacco use disorder     8.  We will continue with restorative and supportive cares please tolerate    B.  Laboratory semination is elected in October 2023 or as needed    C.  The patient's prognosis is guarded.        Electronically Signed By: Devonte Mcclure MD   8/20/23  5:34 PM

## 2023-08-11 ENCOUNTER — NURSING HOME VISIT (OUTPATIENT)
Dept: POST ACUTE CARE | Facility: EXTERNAL LOCATION | Age: 78
End: 2023-08-11
Payer: COMMERCIAL

## 2023-08-11 VITALS
HEART RATE: 76 BPM | DIASTOLIC BLOOD PRESSURE: 82 MMHG | SYSTOLIC BLOOD PRESSURE: 116 MMHG | WEIGHT: 134 LBS | OXYGEN SATURATION: 96 % | BODY MASS INDEX: 15.49 KG/M2 | RESPIRATION RATE: 18 BRPM | TEMPERATURE: 97.8 F

## 2023-08-11 DIAGNOSIS — I10 PRIMARY HYPERTENSION: Primary | ICD-10-CM

## 2023-08-11 DIAGNOSIS — J43.8 OTHER EMPHYSEMA (MULTI): ICD-10-CM

## 2023-08-11 DIAGNOSIS — G24.01 NEUROLEPTIC-INDUCED TARDIVE DYSKINESIA: ICD-10-CM

## 2023-08-11 DIAGNOSIS — F17.200 TOBACCO USE DISORDER: ICD-10-CM

## 2023-08-11 DIAGNOSIS — T43.505A NEUROLEPTIC-INDUCED TARDIVE DYSKINESIA: ICD-10-CM

## 2023-08-11 DIAGNOSIS — F20.9 SCHIZOPHRENIA, UNSPECIFIED TYPE (MULTI): ICD-10-CM

## 2023-08-11 PROCEDURE — 99309 SBSQ NF CARE MODERATE MDM 30: CPT | Performed by: NURSE PRACTITIONER

## 2023-08-11 ASSESSMENT — ENCOUNTER SYMPTOMS
DIFFICULTY URINATING: 0
NAUSEA: 0
ABDOMINAL PAIN: 0
PALPITATIONS: 0
COUGH: 0
FATIGUE: 0
VOMITING: 0
CONSTIPATION: 0
SHORTNESS OF BREATH: 0
CHILLS: 0
FEVER: 0
DIARRHEA: 0

## 2023-08-11 NOTE — LETTER
Patient: Lupe Serrato  : 1945    Encounter Date: 2023    Subjective  Lupe Serrato is a 78 y.o. female Here for routine monthly visit.    HPI There are no new problems and multiple health problems have been reviewed.  The course has been unchanged.  The patient  is oriented.  There are no family members present during time of visit.    she is sitting up at side of bed,  denies any complaints when asked.  Eating and drinking well.   No concerns per staff.       Review of Systems   Constitutional:  Negative for chills, fatigue and fever.   Respiratory:  Negative for cough and shortness of breath.    Cardiovascular:  Negative for chest pain and palpitations.   Gastrointestinal:  Negative for abdominal pain, constipation, diarrhea, nausea and vomiting.   Genitourinary:  Negative for difficulty urinating.       Objective  /82   Pulse 76   Temp 36.6 °C (97.8 °F)   Resp 18   Wt 60.8 kg (134 lb)   SpO2 96%   BMI 15.49 kg/m²     Physical Exam  Constitutional:       General: She is not in acute distress.     Comments: thin   HENT:      Head: Normocephalic and atraumatic.   Eyes:      Conjunctiva/sclera: Conjunctivae normal.   Cardiovascular:      Rate and Rhythm: Normal rate and regular rhythm.   Pulmonary:      Effort: Pulmonary effort is normal. No respiratory distress.      Breath sounds: Normal breath sounds.      Comments: Diminished t/o  Abdominal:      General: Bowel sounds are normal. There is no distension.      Palpations: Abdomen is soft.      Tenderness: There is no abdominal tenderness.   Musculoskeletal:         General: Normal range of motion.      Right lower leg: No edema.      Left lower leg: No edema.   Skin:     General: Skin is warm and dry.   Neurological:      General: No focal deficit present.      Mental Status: She is alert and oriented to person, place, and time.   Psychiatric:         Mood and Affect: Mood normal.         Behavior: Behavior normal.          Assessment/Plan  Problem List Items Addressed This Visit       Chronic obstructive airway disease (CMS/[facility])     no symptoms of acute exacerbation.  staff to monitor and notify for any changes.           Hypertension - Primary     Not currently on meds, continue to montior and initiate meds  based on clinical course         Neuroleptic-induced tardive dyskinesia     Follow up with psychiatry as scheduled.           Schizophrenia (CMS/[facility])     Managed by psychiatry in the facility         Tobacco use disorder     She continues to smoke.  Previously declined smoing cessation          continue with supportive and restorative care  Shelia agreed to labs 4/23 that were ordered through psychiatry, she has previously declined all labs, if she decides to continue to have labs they will be due 10/23      Electronically Signed By: BETH Victor   8/11/23  4:52 PM

## 2023-08-11 NOTE — PROGRESS NOTES
Subjective   Lupe Serrato is a 78 y.o. female Here for routine monthly visit.    HPI There are no new problems and multiple health problems have been reviewed.  The course has been unchanged.  The patient  is oriented.  There are no family members present during time of visit.    she is sitting up at side of bed,  denies any complaints when asked.  Eating and drinking well.   No concerns per staff.       Review of Systems   Constitutional:  Negative for chills, fatigue and fever.   Respiratory:  Negative for cough and shortness of breath.    Cardiovascular:  Negative for chest pain and palpitations.   Gastrointestinal:  Negative for abdominal pain, constipation, diarrhea, nausea and vomiting.   Genitourinary:  Negative for difficulty urinating.       Objective   /82   Pulse 76   Temp 36.6 °C (97.8 °F)   Resp 18   Wt 60.8 kg (134 lb)   SpO2 96%   BMI 15.49 kg/m²     Physical Exam  Constitutional:       General: She is not in acute distress.     Comments: thin   HENT:      Head: Normocephalic and atraumatic.   Eyes:      Conjunctiva/sclera: Conjunctivae normal.   Cardiovascular:      Rate and Rhythm: Normal rate and regular rhythm.   Pulmonary:      Effort: Pulmonary effort is normal. No respiratory distress.      Breath sounds: Normal breath sounds.      Comments: Diminished t/o  Abdominal:      General: Bowel sounds are normal. There is no distension.      Palpations: Abdomen is soft.      Tenderness: There is no abdominal tenderness.   Musculoskeletal:         General: Normal range of motion.      Right lower leg: No edema.      Left lower leg: No edema.   Skin:     General: Skin is warm and dry.   Neurological:      General: No focal deficit present.      Mental Status: She is alert and oriented to person, place, and time.   Psychiatric:         Mood and Affect: Mood normal.         Behavior: Behavior normal.         Assessment/Plan   Problem List Items Addressed This Visit       Chronic obstructive  airway disease (CMS/[facility])     no symptoms of acute exacerbation.  staff to monitor and notify for any changes.           Hypertension - Primary     Not currently on meds, continue to montior and initiate meds  based on clinical course         Neuroleptic-induced tardive dyskinesia     Follow up with psychiatry as scheduled.           Schizophrenia (CMS/[facility])     Managed by psychiatry in the facility         Tobacco use disorder     She continues to smoke.  Previously declined smoing cessation          continue with supportive and restorative care  Shelia agreed to labs 4/23 that were ordered through psychiatry, she has previously declined all labs, if she decides to continue to have labs they will be due 10/23

## 2023-08-20 ASSESSMENT — ENCOUNTER SYMPTOMS
PALPITATIONS: 0
CONSTIPATION: 0
CHILLS: 0
COUGH: 0
FEVER: 0
SHORTNESS OF BREATH: 0
DIFFICULTY URINATING: 0
DIARRHEA: 0
VOMITING: 0
ABDOMINAL PAIN: 0
NAUSEA: 0
FATIGUE: 0

## 2023-09-14 ENCOUNTER — NURSING HOME VISIT (OUTPATIENT)
Dept: POST ACUTE CARE | Facility: EXTERNAL LOCATION | Age: 78
End: 2023-09-14
Payer: COMMERCIAL

## 2023-09-14 DIAGNOSIS — G24.01 NEUROLEPTIC-INDUCED TARDIVE DYSKINESIA: ICD-10-CM

## 2023-09-14 DIAGNOSIS — J43.8 OTHER EMPHYSEMA (MULTI): ICD-10-CM

## 2023-09-14 DIAGNOSIS — F20.9 SCHIZOPHRENIA, UNSPECIFIED TYPE (MULTI): ICD-10-CM

## 2023-09-14 DIAGNOSIS — T43.505A NEUROLEPTIC-INDUCED TARDIVE DYSKINESIA: ICD-10-CM

## 2023-09-14 DIAGNOSIS — I10 PRIMARY HYPERTENSION: Primary | ICD-10-CM

## 2023-09-14 DIAGNOSIS — F17.200 TOBACCO USE DISORDER: ICD-10-CM

## 2023-09-14 PROCEDURE — 99308 SBSQ NF CARE LOW MDM 20: CPT | Performed by: INTERNAL MEDICINE

## 2023-09-14 NOTE — LETTER
Patient: Lupe Serrato  : 1945    Encounter Date: 2023    Subjective  Patient ID: Lupe Serrato is a 78 y.o. female who is long term resident being seen and evaluated for multiple medical problems.    HPI   This 78-year-old female patient is resting comfortably in her room in no distress whatsoever.  She has no complaints of shortness of breath or pain.  She continues to smoke.  Nursing has no new adverse events reported to me at this time.    Current high risk medication:  Benztropine  Seroquel  Potassium chloride    Laboratory examination from 2023:  Potassium 4.3  Bicarbonate 26  Creatinine 0.5  Albumin 3.0  Liver injury test normal  Hemoglobin 11.6  Folic acid 10.2  Vitamin B12 410  Vitamin D 47    Review of Systems   Constitutional:  Negative for chills, fatigue and fever.   Respiratory:  Negative for cough and shortness of breath.    Cardiovascular:  Negative for chest pain and palpitations.   Gastrointestinal:  Negative for abdominal pain, constipation, diarrhea, nausea and vomiting.   Genitourinary:  Negative for difficulty urinating.       Objective  /86   Pulse 84   Temp 36.4 °C (97.6 °F)   Resp 18   Wt 59 kg (130 lb)   SpO2 98%   BMI 15.02 kg/m²     Physical Exam  Constitutional:       General: She is not in acute distress.     Comments: thin   HENT:      Head: Normocephalic and atraumatic.   Eyes:      Conjunctiva/sclera: Conjunctivae normal.   Cardiovascular:      Rate and Rhythm: Normal rate and regular rhythm.   Pulmonary:      Effort: Pulmonary effort is normal. No respiratory distress.      Breath sounds: Normal breath sounds.      Comments: Diminished t/o  Abdominal:      General: Bowel sounds are normal. There is no distension.      Palpations: Abdomen is soft.      Tenderness: There is no abdominal tenderness.   Musculoskeletal:         General: Normal range of motion.      Right lower leg: No edema.      Left lower leg: No edema.   Skin:     General: Skin is  warm and dry.   Neurological:      General: No focal deficit present.      Mental Status: She is alert and oriented to person, place, and time.   Psychiatric:         Mood and Affect: Mood normal.         Behavior: Behavior normal.         Assessment/Plan  Problem List Items Addressed This Visit       Chronic obstructive airway disease (CMS/HCC)    Hypertension - Primary    Neuroleptic-induced tardive dyskinesia    Schizophrenia (CMS/HCC)    Tobacco use disorder     8.  We will continue with restorative and supportive care as patient tolerates    B.  Laboratory examinations will next be due in October 2023 or as needed as long as the patient agrees to the lab draw    C.  The patient's prognosis is guarded.        Electronically Signed By: Devonte Mcclure MD   9/22/23 12:56 PM

## 2023-09-20 VITALS
BODY MASS INDEX: 15.02 KG/M2 | SYSTOLIC BLOOD PRESSURE: 124 MMHG | RESPIRATION RATE: 18 BRPM | WEIGHT: 130 LBS | OXYGEN SATURATION: 98 % | TEMPERATURE: 97.6 F | HEART RATE: 84 BPM | DIASTOLIC BLOOD PRESSURE: 86 MMHG

## 2023-09-22 ASSESSMENT — ENCOUNTER SYMPTOMS
FATIGUE: 0
PALPITATIONS: 0
NAUSEA: 0
CHILLS: 0
FEVER: 0
VOMITING: 0
SHORTNESS OF BREATH: 0
ABDOMINAL PAIN: 0
DIARRHEA: 0
DIFFICULTY URINATING: 0
COUGH: 0
CONSTIPATION: 0

## 2023-11-02 ENCOUNTER — NURSING HOME VISIT (OUTPATIENT)
Dept: POST ACUTE CARE | Facility: EXTERNAL LOCATION | Age: 78
End: 2023-11-02
Payer: COMMERCIAL

## 2023-11-02 DIAGNOSIS — J43.8 OTHER EMPHYSEMA (MULTI): ICD-10-CM

## 2023-11-02 DIAGNOSIS — F20.9 SCHIZOPHRENIA, UNSPECIFIED TYPE (MULTI): ICD-10-CM

## 2023-11-02 DIAGNOSIS — E78.5 HYPERLIPIDEMIA, UNSPECIFIED HYPERLIPIDEMIA TYPE: ICD-10-CM

## 2023-11-02 DIAGNOSIS — F17.200 TOBACCO USE DISORDER: ICD-10-CM

## 2023-11-02 DIAGNOSIS — T43.505A NEUROLEPTIC-INDUCED TARDIVE DYSKINESIA: Primary | ICD-10-CM

## 2023-11-02 DIAGNOSIS — G24.01 NEUROLEPTIC-INDUCED TARDIVE DYSKINESIA: Primary | ICD-10-CM

## 2023-11-02 DIAGNOSIS — I10 PRIMARY HYPERTENSION: ICD-10-CM

## 2023-11-02 PROCEDURE — 99309 SBSQ NF CARE MODERATE MDM 30: CPT | Performed by: INTERNAL MEDICINE

## 2023-11-02 NOTE — LETTER
Patient: Lupe Serrato  : 1945    Encounter Date: 2023    Subjective  Patient ID: Lupe Serrato is a 78 y.o. female who is long term resident being seen and evaluated for multiple medical problems.    HPI   This 78-year-old female patient is resting comfortably in her room being visited by friends.  She has no complaints for me at all.  Nursing has no new adverse events reported to me.    Current high risk medication:  Benztropine  Potassium  Seroquel    Laboratory examinations from 2023 have been reviewed in detail by me.    Review of Systems   Constitutional:  Negative for chills, fatigue and fever.   Respiratory:  Negative for cough and shortness of breath.    Cardiovascular:  Negative for chest pain and palpitations.   Gastrointestinal:  Negative for abdominal pain, constipation, diarrhea, nausea and vomiting.   Genitourinary:  Negative for difficulty urinating.       Objective  /74   Pulse 83   Temp 37.1 °C (98.7 °F)   Resp 18   Wt 54 kg (119 lb)   SpO2 98%   BMI 13.75 kg/m²     Physical Exam  Constitutional:       General: She is not in acute distress.     Comments: thin   HENT:      Head: Normocephalic and atraumatic.   Eyes:      Conjunctiva/sclera: Conjunctivae normal.   Cardiovascular:      Rate and Rhythm: Normal rate and regular rhythm.   Pulmonary:      Effort: Pulmonary effort is normal. No respiratory distress.      Breath sounds: Normal breath sounds.      Comments: Diminished t/o  Abdominal:      General: Bowel sounds are normal. There is no distension.      Palpations: Abdomen is soft.      Tenderness: There is no abdominal tenderness.   Musculoskeletal:         General: Normal range of motion.      Right lower leg: No edema.      Left lower leg: No edema.   Skin:     General: Skin is warm and dry.   Neurological:      General: No focal deficit present.      Mental Status: She is alert and oriented to person, place, and time.      Comments: The patient's tardive  dyskinesia is becoming more apparent with bilateral hand tremors and minimal balance disorder.   Psychiatric:         Mood and Affect: Mood normal.         Behavior: Behavior normal.         Assessment/Plan  Problem List Items Addressed This Visit             ICD-10-CM    Chronic obstructive airway disease (CMS/[facility]) J44.9    HLD (hyperlipidemia) E78.5    Hypertension I10    Neuroleptic-induced tardive dyskinesia - Primary G24.01, T43.505A    Schizophrenia (CMS/[facility]) F20.9    Tobacco use disorder F17.200     A.  We will continue with restorative and supportive care as the patient tolerates    B.  The patient's tardive dyskinesia is becoming much more apparent.  She remains relatively functional schizophrenic up and ambulatory out to smoke and moving throughout the facility.  At this time consideration should be made regarding initiation of one of the newer medications formulated for relief of tardive dyskinesia in these conditions.  1 that has worked well for another patient of mine is in [facility].  From my research on that medication it became clear that the data is questionable on the utility of benztropine for neuroleptic induced tardive dyskinesia.    C.  Laboratory examinations will next be due on an ongoing as-needed basis in the next several months.    D.  The patient's prognosis is guarded.        Electronically Signed By: Devonte Mcclure MD   11/7/23 10:48 AM

## 2023-11-06 VITALS
TEMPERATURE: 98.7 F | HEART RATE: 83 BPM | WEIGHT: 119 LBS | DIASTOLIC BLOOD PRESSURE: 74 MMHG | OXYGEN SATURATION: 98 % | RESPIRATION RATE: 18 BRPM | SYSTOLIC BLOOD PRESSURE: 126 MMHG | BODY MASS INDEX: 13.75 KG/M2

## 2023-11-06 NOTE — PROGRESS NOTES
Subjective   Patient ID: Lupe Serrato is a 78 y.o. female who is long term resident being seen and evaluated for multiple medical problems.    HPI   This 78-year-old female patient is resting comfortably in her room being visited by friends.  She has no complaints for me at all.  Nursing has no new adverse events reported to me.    Current high risk medication:  Benztropine  Potassium  Seroquel    Laboratory examinations from April 2023 have been reviewed in detail by me.    Review of Systems   Constitutional:  Negative for chills, fatigue and fever.   Respiratory:  Negative for cough and shortness of breath.    Cardiovascular:  Negative for chest pain and palpitations.   Gastrointestinal:  Negative for abdominal pain, constipation, diarrhea, nausea and vomiting.   Genitourinary:  Negative for difficulty urinating.       Objective   /74   Pulse 83   Temp 37.1 °C (98.7 °F)   Resp 18   Wt 54 kg (119 lb)   SpO2 98%   BMI 13.75 kg/m²     Physical Exam  Constitutional:       General: She is not in acute distress.     Comments: thin   HENT:      Head: Normocephalic and atraumatic.   Eyes:      Conjunctiva/sclera: Conjunctivae normal.   Cardiovascular:      Rate and Rhythm: Normal rate and regular rhythm.   Pulmonary:      Effort: Pulmonary effort is normal. No respiratory distress.      Breath sounds: Normal breath sounds.      Comments: Diminished t/o  Abdominal:      General: Bowel sounds are normal. There is no distension.      Palpations: Abdomen is soft.      Tenderness: There is no abdominal tenderness.   Musculoskeletal:         General: Normal range of motion.      Right lower leg: No edema.      Left lower leg: No edema.   Skin:     General: Skin is warm and dry.   Neurological:      General: No focal deficit present.      Mental Status: She is alert and oriented to person, place, and time.      Comments: The patient's tardive dyskinesia is becoming more apparent with bilateral hand tremors and  minimal balance disorder.   Psychiatric:         Mood and Affect: Mood normal.         Behavior: Behavior normal.         Assessment/Plan   Problem List Items Addressed This Visit             ICD-10-CM    Chronic obstructive airway disease (CMS/[facility]) J44.9    HLD (hyperlipidemia) E78.5    Hypertension I10    Neuroleptic-induced tardive dyskinesia - Primary G24.01, T43.505A    Schizophrenia (CMS/[facility]) F20.9    Tobacco use disorder F17.200     A.  We will continue with restorative and supportive care as the patient tolerates    B.  The patient's tardive dyskinesia is becoming much more apparent.  She remains relatively functional schizophrenic up and ambulatory out to smoke and moving throughout the facility.  At this time consideration should be made regarding initiation of one of the newer medications formulated for relief of tardive dyskinesia in these conditions.  1 that has worked well for another patient of mine is in [facility].  From my research on that medication it became clear that the data is questionable on the utility of benztropine for neuroleptic induced tardive dyskinesia.    C.  Laboratory examinations will next be due on an ongoing as-needed basis in the next several months.    D.  The patient's prognosis is guarded.

## 2023-11-07 PROBLEM — G24.01 TARDIVE DYSKINESIA: Status: RESOLVED | Noted: 2023-11-07 | Resolved: 2023-11-07

## 2023-11-07 PROBLEM — G24.01 TARDIVE DYSKINESIA: Status: ACTIVE | Noted: 2023-11-07

## 2023-11-07 ASSESSMENT — ENCOUNTER SYMPTOMS
SHORTNESS OF BREATH: 0
COUGH: 0
FATIGUE: 0
VOMITING: 0
DIFFICULTY URINATING: 0
NAUSEA: 0
CHILLS: 0
CONSTIPATION: 0
ABDOMINAL PAIN: 0
FEVER: 0
PALPITATIONS: 0
DIARRHEA: 0

## 2023-12-21 ENCOUNTER — NURSING HOME VISIT (OUTPATIENT)
Dept: POST ACUTE CARE | Facility: EXTERNAL LOCATION | Age: 78
End: 2023-12-21
Payer: COMMERCIAL

## 2023-12-21 VITALS
TEMPERATURE: 97.2 F | DIASTOLIC BLOOD PRESSURE: 75 MMHG | HEART RATE: 75 BPM | SYSTOLIC BLOOD PRESSURE: 128 MMHG | WEIGHT: 114 LBS | RESPIRATION RATE: 18 BRPM | BODY MASS INDEX: 13.17 KG/M2 | OXYGEN SATURATION: 98 %

## 2023-12-21 DIAGNOSIS — J43.8 OTHER EMPHYSEMA (MULTI): ICD-10-CM

## 2023-12-21 DIAGNOSIS — F17.200 TOBACCO USE DISORDER: ICD-10-CM

## 2023-12-21 DIAGNOSIS — R63.4 WEIGHT LOSS, NON-INTENTIONAL: Primary | ICD-10-CM

## 2023-12-21 DIAGNOSIS — T43.505A NEUROLEPTIC-INDUCED TARDIVE DYSKINESIA: ICD-10-CM

## 2023-12-21 DIAGNOSIS — G24.01 NEUROLEPTIC-INDUCED TARDIVE DYSKINESIA: ICD-10-CM

## 2023-12-21 DIAGNOSIS — F20.9 SCHIZOPHRENIA, UNSPECIFIED TYPE (MULTI): ICD-10-CM

## 2023-12-21 PROCEDURE — 99309 SBSQ NF CARE MODERATE MDM 30: CPT | Performed by: INTERNAL MEDICINE

## 2023-12-21 NOTE — PROGRESS NOTES
Subjective   Patient ID: Lupe Serrato is a 78 y.o. female who is long term resident being seen and evaluated for multiple medical problems.    HPI   This 78-year-old female patient is sitting comfortably in her room in no distress.  She has no new complaints for me.  Nursing has no new adverse events to report to me other than the patient is lost approximately 15 pounds in the last several months.    Current high risk medication:  Benztropine  Seroquel    Laboratory examination from April 2023:  Potassium 4.3  Bicarbonate 26  Creatinine 0.5  Albumin 3.0  Liver injury test normal  Hemoglobin 11.6  Folic acid 10  Vitamin B12 410  Vitamin D 47      Review of Systems   Constitutional:  Negative for chills, fatigue and fever.   Respiratory:  Negative for cough and shortness of breath.    Cardiovascular:  Negative for chest pain and palpitations.   Gastrointestinal:  Negative for abdominal pain, constipation, diarrhea, nausea and vomiting.   Genitourinary:  Negative for difficulty urinating.       Objective   /75   Pulse 75   Temp 36.2 °C (97.2 °F)   Resp 18   Wt 51.7 kg (114 lb)   SpO2 98%   BMI 13.17 kg/m²     Physical Exam  Constitutional:       General: She is not in acute distress.     Comments: thin   HENT:      Head: Normocephalic and atraumatic.   Eyes:      Conjunctiva/sclera: Conjunctivae normal.   Cardiovascular:      Rate and Rhythm: Normal rate and regular rhythm.   Pulmonary:      Effort: Pulmonary effort is normal. No respiratory distress.      Breath sounds: Normal breath sounds.      Comments: Diminished t/o  Abdominal:      General: Bowel sounds are normal. There is no distension.      Palpations: Abdomen is soft.      Tenderness: There is no abdominal tenderness.   Musculoskeletal:         General: Normal range of motion.      Right lower leg: No edema.      Left lower leg: No edema.   Skin:     General: Skin is warm and dry.   Neurological:      General: No focal deficit present.       Mental Status: She is alert and oriented to person, place, and time.      Comments: The patient's tardive dyskinesia is becoming more apparent with bilateral hand tremors and minimal balance disorder.   Psychiatric:         Mood and Affect: Mood normal.         Behavior: Behavior normal.         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  We will continue with restorative and supportive care as the patient tolerates    B.  Laboratory examinations are ordered to try to come to some understanding that the patient is having a medical or hormonal situation contributing to her weight loss.    C.  If no metabolic reason can be found for weight loss and the assumption is that the patient is having anorexia related to her underlying psychiatric disorders then consideration can be made for addition of low-dose mirtazapine at night for appetite stimulation if this is okay with psychiatry.    D.  The patient's prognosis is guarded.

## 2023-12-21 NOTE — LETTER
Patient: Lupe Serrato  : 1945    Encounter Date: 2023    Subjective  Patient ID: Lupe Serrato is a 78 y.o. female who is long term resident being seen and evaluated for multiple medical problems.    HPI   This 78-year-old female patient is sitting comfortably in her room in no distress.  She has no new complaints for me.  Nursing has no new adverse events to report to me other than the patient is lost approximately 15 pounds in the last several months.    Current high risk medication:  Benztropine  Seroquel    Laboratory examination from 2023:  Potassium 4.3  Bicarbonate 26  Creatinine 0.5  Albumin 3.0  Liver injury test normal  Hemoglobin 11.6  Folic acid 10  Vitamin B12 410  Vitamin D 47      Review of Systems   Constitutional:  Negative for chills, fatigue and fever.   Respiratory:  Negative for cough and shortness of breath.    Cardiovascular:  Negative for chest pain and palpitations.   Gastrointestinal:  Negative for abdominal pain, constipation, diarrhea, nausea and vomiting.   Genitourinary:  Negative for difficulty urinating.       Objective  /75   Pulse 75   Temp 36.2 °C (97.2 °F)   Resp 18   Wt 51.7 kg (114 lb)   SpO2 98%   BMI 13.17 kg/m²     Physical Exam  Constitutional:       General: She is not in acute distress.     Comments: thin   HENT:      Head: Normocephalic and atraumatic.   Eyes:      Conjunctiva/sclera: Conjunctivae normal.   Cardiovascular:      Rate and Rhythm: Normal rate and regular rhythm.   Pulmonary:      Effort: Pulmonary effort is normal. No respiratory distress.      Breath sounds: Normal breath sounds.      Comments: Diminished t/o  Abdominal:      General: Bowel sounds are normal. There is no distension.      Palpations: Abdomen is soft.      Tenderness: There is no abdominal tenderness.   Musculoskeletal:         General: Normal range of motion.      Right lower leg: No edema.      Left lower leg: No edema.   Skin:     General: Skin is warm  and dry.   Neurological:      General: No focal deficit present.      Mental Status: She is alert and oriented to person, place, and time.      Comments: The patient's tardive dyskinesia is becoming more apparent with bilateral hand tremors and minimal balance disorder.   Psychiatric:         Mood and Affect: Mood normal.         Behavior: Behavior normal.         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  We will continue with restorative and supportive care as the patient tolerates    B.  Laboratory examinations are ordered to try to come to some understanding that the patient is having a medical or hormonal situation contributing to her weight loss.    C.  If no metabolic reason can be found for weight loss and the assumption is that the patient is having anorexia related to her underlying psychiatric disorders then consideration can be made for addition of low-dose mirtazapine at night for appetite stimulation if this is okay with psychiatry.    D.  The patient's prognosis is guarded.      Electronically Signed By: Devonte Mcclure MD   12/28/23  2:57 PM

## 2023-12-28 PROBLEM — R63.4 WEIGHT LOSS, NON-INTENTIONAL: Status: ACTIVE | Noted: 2023-12-28

## 2023-12-28 ASSESSMENT — ENCOUNTER SYMPTOMS
CHILLS: 0
ABDOMINAL PAIN: 0
DIARRHEA: 0
SHORTNESS OF BREATH: 0
VOMITING: 0
CONSTIPATION: 0
FEVER: 0
NAUSEA: 0
COUGH: 0
DIFFICULTY URINATING: 0
FATIGUE: 0
PALPITATIONS: 0

## 2024-01-25 ENCOUNTER — NURSING HOME VISIT (OUTPATIENT)
Dept: POST ACUTE CARE | Facility: EXTERNAL LOCATION | Age: 79
End: 2024-01-25
Payer: COMMERCIAL

## 2024-01-25 DIAGNOSIS — F17.200 TOBACCO USE DISORDER: ICD-10-CM

## 2024-01-25 DIAGNOSIS — J43.8 OTHER EMPHYSEMA (MULTI): ICD-10-CM

## 2024-01-25 DIAGNOSIS — G24.01 NEUROLEPTIC-INDUCED TARDIVE DYSKINESIA: Primary | ICD-10-CM

## 2024-01-25 DIAGNOSIS — F20.9 SCHIZOPHRENIA, UNSPECIFIED TYPE (MULTI): ICD-10-CM

## 2024-01-25 DIAGNOSIS — T43.505A NEUROLEPTIC-INDUCED TARDIVE DYSKINESIA: Primary | ICD-10-CM

## 2024-01-25 DIAGNOSIS — I47.9 TACHYCARDIA, PAROXYSMAL (MULTI): ICD-10-CM

## 2024-01-25 DIAGNOSIS — E46 PROTEIN-CALORIE MALNUTRITION, UNSPECIFIED SEVERITY (MULTI): ICD-10-CM

## 2024-01-25 PROCEDURE — 99308 SBSQ NF CARE LOW MDM 20: CPT | Performed by: INTERNAL MEDICINE

## 2024-01-25 NOTE — LETTER
Patient: Lupe Serrato  : 1945    Encounter Date: 2024    Subjective  Patient ID: Lupe Serrato is a 78 y.o. female who is long term resident being seen and evaluated for multiple medical problems.    HPI   This 78-year-old female patient is resting comfortably in her room in no distress.  She has no new complaints for me.  Nursing has no new adverse events reported to me.  The patient is apparently been started on valbenazine for tardive dyskinesia by psychiatry.    Current high risk medication:  Seroquel  Potassium  Valbenazine    Laboratory examinations from 2023 have been reviewed in detail by me.    Review of Systems   Constitutional:  Negative for chills, fatigue and fever.   Respiratory:  Negative for cough and shortness of breath.    Cardiovascular:  Negative for chest pain and palpitations.   Gastrointestinal:  Negative for abdominal pain, constipation, diarrhea, nausea and vomiting.   Genitourinary:  Negative for difficulty urinating.       Objective  /62   Pulse 90   Temp 36.2 °C (97.2 °F)   Resp 20   Wt 49.9 kg (110 lb)   SpO2 97%   BMI 12.71 kg/m²     Physical Exam  Constitutional:       General: She is not in acute distress.     Comments: thin   HENT:      Head: Normocephalic and atraumatic.   Eyes:      Conjunctiva/sclera: Conjunctivae normal.   Cardiovascular:      Rate and Rhythm: Normal rate and regular rhythm.   Pulmonary:      Effort: Pulmonary effort is normal. No respiratory distress.      Breath sounds: Normal breath sounds.      Comments: Diminished t/o  Abdominal:      General: Bowel sounds are normal. There is no distension.      Palpations: Abdomen is soft.      Tenderness: There is no abdominal tenderness.   Musculoskeletal:         General: Normal range of motion.      Right lower leg: No edema.      Left lower leg: No edema.   Skin:     General: Skin is warm and dry.   Neurological:      General: No focal deficit present.      Mental Status: She is  alert and oriented to person, place, and time.      Comments: The patient's tardive dyskinesia is becoming more apparent with bilateral hand tremors and minimal balance disorder.   Psychiatric:         Mood and Affect: Mood normal.         Behavior: Behavior normal.         Assessment/Plan  Problem List Items Addressed This Visit             ICD-10-CM    Chronic obstructive airway disease (CMS/[facility]) J44.9     Stable at present patient continues to smoke cigarettes         Neuroleptic-induced tardive dyskinesia - Primary G24.01, T43.505A    Schizophrenia (CMS/[facility]) F20.9     Slowly progressive but stable at present         Tachycardia, paroxysmal (CMS/[facility]) I47.9     No current events         Tobacco use disorder F17.200    Protein-calorie malnutrition, unspecified severity (CMS/[facility]) E46     Chronic problem for the patient            A.  We will continue with restorative and supportive care as the patient tolerates    B.  The patient has been started on valbenazine for severe tardive dyskinesia.  At this time benztropine will be discontinued.    C.  Laboratory examinations will be monitored as the patient agrees to    D.  The patient's prognosis is guarded.      Electronically Signed By: Devonte Mcclure MD   2/1/24  3:39 PM

## 2024-01-26 VITALS
DIASTOLIC BLOOD PRESSURE: 62 MMHG | OXYGEN SATURATION: 97 % | BODY MASS INDEX: 12.71 KG/M2 | WEIGHT: 110 LBS | HEART RATE: 90 BPM | TEMPERATURE: 97.2 F | RESPIRATION RATE: 20 BRPM | SYSTOLIC BLOOD PRESSURE: 112 MMHG

## 2024-01-26 NOTE — PROGRESS NOTES
Subjective   Patient ID: Lupe Serrato is a 78 y.o. female who is long term resident being seen and evaluated for multiple medical problems.    HPI   This 78-year-old female patient is resting comfortably in her room in no distress.  She has no new complaints for me.  Nursing has no new adverse events reported to me.  The patient is apparently been started on valbenazine for tardive dyskinesia by psychiatry.    Current high risk medication:  Seroquel  Potassium  Valbenazine    Laboratory examinations from April 2023 have been reviewed in detail by me.    Review of Systems   Constitutional:  Negative for chills, fatigue and fever.   Respiratory:  Negative for cough and shortness of breath.    Cardiovascular:  Negative for chest pain and palpitations.   Gastrointestinal:  Negative for abdominal pain, constipation, diarrhea, nausea and vomiting.   Genitourinary:  Negative for difficulty urinating.       Objective   /62   Pulse 90   Temp 36.2 °C (97.2 °F)   Resp 20   Wt 49.9 kg (110 lb)   SpO2 97%   BMI 12.71 kg/m²     Physical Exam  Constitutional:       General: She is not in acute distress.     Comments: thin   HENT:      Head: Normocephalic and atraumatic.   Eyes:      Conjunctiva/sclera: Conjunctivae normal.   Cardiovascular:      Rate and Rhythm: Normal rate and regular rhythm.   Pulmonary:      Effort: Pulmonary effort is normal. No respiratory distress.      Breath sounds: Normal breath sounds.      Comments: Diminished t/o  Abdominal:      General: Bowel sounds are normal. There is no distension.      Palpations: Abdomen is soft.      Tenderness: There is no abdominal tenderness.   Musculoskeletal:         General: Normal range of motion.      Right lower leg: No edema.      Left lower leg: No edema.   Skin:     General: Skin is warm and dry.   Neurological:      General: No focal deficit present.      Mental Status: She is alert and oriented to person, place, and time.      Comments: The  patient's tardive dyskinesia is becoming more apparent with bilateral hand tremors and minimal balance disorder.   Psychiatric:         Mood and Affect: Mood normal.         Behavior: Behavior normal.         Assessment/Plan   Problem List Items Addressed This Visit             ICD-10-CM    Chronic obstructive airway disease (CMS/[facility]) J44.9     Stable at present patient continues to smoke cigarettes         Neuroleptic-induced tardive dyskinesia - Primary G24.01, T43.505A    Schizophrenia (CMS/[facility]) F20.9     Slowly progressive but stable at present         Tachycardia, paroxysmal (CMS/[facility]) I47.9     No current events         Tobacco use disorder F17.200    Protein-calorie malnutrition, unspecified severity (CMS/[facility]) E46     Chronic problem for the patient            A.  We will continue with restorative and supportive care as the patient tolerates    B.  The patient has been started on valbenazine for severe tardive dyskinesia.  At this time benztropine will be discontinued.    C.  Laboratory examinations will be monitored as the patient agrees to    D.  The patient's prognosis is guarded.

## 2024-02-01 PROBLEM — E46 PROTEIN-CALORIE MALNUTRITION, UNSPECIFIED SEVERITY (MULTI): Status: ACTIVE | Noted: 2024-02-01

## 2024-02-01 ASSESSMENT — ENCOUNTER SYMPTOMS
NAUSEA: 0
SHORTNESS OF BREATH: 0
DIARRHEA: 0
CHILLS: 0
FEVER: 0
CONSTIPATION: 0
FATIGUE: 0
DIFFICULTY URINATING: 0
COUGH: 0
PALPITATIONS: 0
ABDOMINAL PAIN: 0
VOMITING: 0

## 2024-02-19 ENCOUNTER — NURSING HOME VISIT (OUTPATIENT)
Dept: POST ACUTE CARE | Facility: EXTERNAL LOCATION | Age: 79
End: 2024-02-19
Payer: COMMERCIAL

## 2024-02-19 VITALS
WEIGHT: 107 LBS | BODY MASS INDEX: 12.37 KG/M2 | TEMPERATURE: 97.8 F | SYSTOLIC BLOOD PRESSURE: 120 MMHG | DIASTOLIC BLOOD PRESSURE: 72 MMHG | HEART RATE: 72 BPM | OXYGEN SATURATION: 95 % | RESPIRATION RATE: 18 BRPM

## 2024-02-19 DIAGNOSIS — G24.01 NEUROLEPTIC-INDUCED TARDIVE DYSKINESIA: ICD-10-CM

## 2024-02-19 DIAGNOSIS — F20.9 SCHIZOPHRENIA, UNSPECIFIED TYPE (MULTI): ICD-10-CM

## 2024-02-19 DIAGNOSIS — T43.505A NEUROLEPTIC-INDUCED TARDIVE DYSKINESIA: ICD-10-CM

## 2024-02-19 DIAGNOSIS — R63.4 WEIGHT LOSS: ICD-10-CM

## 2024-02-19 DIAGNOSIS — F17.200 TOBACCO USE DISORDER: ICD-10-CM

## 2024-02-19 DIAGNOSIS — I10 PRIMARY HYPERTENSION: Primary | ICD-10-CM

## 2024-02-19 DIAGNOSIS — J43.8 OTHER EMPHYSEMA (MULTI): ICD-10-CM

## 2024-02-19 PROCEDURE — 99309 SBSQ NF CARE MODERATE MDM 30: CPT | Performed by: NURSE PRACTITIONER

## 2024-02-19 ASSESSMENT — ENCOUNTER SYMPTOMS
DIFFICULTY URINATING: 0
CONSTIPATION: 0
COUGH: 0
NAUSEA: 0
PALPITATIONS: 0
DIARRHEA: 0
SHORTNESS OF BREATH: 0
FEVER: 0
CHILLS: 0
FATIGUE: 0
VOMITING: 0
ABDOMINAL PAIN: 0

## 2024-02-19 NOTE — PROGRESS NOTES
Subjective   Lupe Serrato is a 78 y.o. female Here for routine monthly visit.    HPI There are no new problems and multiple health problems have been reviewed.  The course has been unchanged.  The patient  is oriented.  There are no family members present during time of visit.    she is sitting up at side of bed,  denies any complaints when asked.  Eating and drinking well per Shelia, she has lost weight since last visit.  No concerns per staff.       Review of Systems   Constitutional:  Negative for chills, fatigue and fever.   Respiratory:  Negative for cough and shortness of breath.    Cardiovascular:  Negative for chest pain and palpitations.   Gastrointestinal:  Negative for abdominal pain, constipation, diarrhea, nausea and vomiting.   Genitourinary:  Negative for difficulty urinating.       Objective   /72   Pulse 72   Temp 36.6 °C (97.8 °F)   Resp 18   Wt 48.5 kg (107 lb)   SpO2 95%   BMI 12.37 kg/m²     Physical Exam  Constitutional:       General: She is not in acute distress.     Comments: thin   HENT:      Head: Normocephalic and atraumatic.   Eyes:      Conjunctiva/sclera: Conjunctivae normal.   Cardiovascular:      Rate and Rhythm: Normal rate and regular rhythm.   Pulmonary:      Effort: Pulmonary effort is normal. No respiratory distress.      Breath sounds: Normal breath sounds.      Comments: Diminished t/o  Abdominal:      General: Bowel sounds are normal. There is no distension.      Palpations: Abdomen is soft.      Tenderness: There is no abdominal tenderness.   Musculoskeletal:         General: Normal range of motion.      Right lower leg: No edema.      Left lower leg: No edema.   Skin:     General: Skin is warm and dry.   Neurological:      General: No focal deficit present.      Mental Status: She is alert and oriented to person, place, and time.   Psychiatric:         Mood and Affect: Mood normal.         Behavior: Behavior normal.         Assessment/Plan   Problem List Items  Addressed This Visit       Chronic obstructive airway disease (CMS/[facility])     no symptoms of acute exacerbation.  staff to monitor and notify for any changes.           Hypertension - Primary     Not currently on meds, continue to montior and initiate meds  based on clinical course         Neuroleptic-induced tardive dyskinesia     Follow up with psychiatry as scheduled.  Started on ingrezza          Schizophrenia (CMS/[facility])     Managed by psychiatry in the facility         Tobacco use disorder     She continues to smoke.  Previously declined smoing cessation         Weight loss     She has lost weight, she declines to have any labs or workup for this, will continue to monitor.         labs/meds/orders reviewed  staff to monitor and notify for any changes.  continue with supportive and restorative care  She declines routine labs at this time  Monitor weights

## 2024-02-19 NOTE — LETTER
Patient: Lupe Serrato  : 1945    Encounter Date: 2024    Subjective  Lupe Serrato is a 78 y.o. female Here for routine monthly visit.    HPI There are no new problems and multiple health problems have been reviewed.  The course has been unchanged.  The patient  is oriented.  There are no family members present during time of visit.    she is sitting up at side of bed,  denies any complaints when asked.  Eating and drinking well per Shelia, she has lost weight since last visit.  No concerns per staff.       Review of Systems   Constitutional:  Negative for chills, fatigue and fever.   Respiratory:  Negative for cough and shortness of breath.    Cardiovascular:  Negative for chest pain and palpitations.   Gastrointestinal:  Negative for abdominal pain, constipation, diarrhea, nausea and vomiting.   Genitourinary:  Negative for difficulty urinating.       Objective  /72   Pulse 72   Temp 36.6 °C (97.8 °F)   Resp 18   Wt 48.5 kg (107 lb)   SpO2 95%   BMI 12.37 kg/m²     Physical Exam  Constitutional:       General: She is not in acute distress.     Comments: thin   HENT:      Head: Normocephalic and atraumatic.   Eyes:      Conjunctiva/sclera: Conjunctivae normal.   Cardiovascular:      Rate and Rhythm: Normal rate and regular rhythm.   Pulmonary:      Effort: Pulmonary effort is normal. No respiratory distress.      Breath sounds: Normal breath sounds.      Comments: Diminished t/o  Abdominal:      General: Bowel sounds are normal. There is no distension.      Palpations: Abdomen is soft.      Tenderness: There is no abdominal tenderness.   Musculoskeletal:         General: Normal range of motion.      Right lower leg: No edema.      Left lower leg: No edema.   Skin:     General: Skin is warm and dry.   Neurological:      General: No focal deficit present.      Mental Status: She is alert and oriented to person, place, and time.   Psychiatric:         Mood and Affect: Mood normal.          Behavior: Behavior normal.         Assessment/Plan  Problem List Items Addressed This Visit       Chronic obstructive airway disease (CMS/[facility])     no symptoms of acute exacerbation.  staff to monitor and notify for any changes.           Hypertension - Primary     Not currently on meds, continue to montior and initiate meds  based on clinical course         Neuroleptic-induced tardive dyskinesia     Follow up with psychiatry as scheduled.  Started on ingrezza          Schizophrenia (CMS/[facility])     Managed by psychiatry in the facility         Tobacco use disorder     She continues to smoke.  Previously declined smoing cessation         Weight loss     She has lost weight, she declines to have any labs or workup for this, will continue to monitor.         labs/meds/orders reviewed  staff to monitor and notify for any changes.  continue with supportive and restorative care  She declines routine labs at this time  Monitor weights        Electronically Signed By: BETH Victor   2/19/24  4:37 PM

## 2024-02-22 ENCOUNTER — NURSING HOME VISIT (OUTPATIENT)
Dept: POST ACUTE CARE | Facility: EXTERNAL LOCATION | Age: 79
End: 2024-02-22
Payer: COMMERCIAL

## 2024-02-22 VITALS
SYSTOLIC BLOOD PRESSURE: 117 MMHG | HEART RATE: 75 BPM | WEIGHT: 107 LBS | TEMPERATURE: 98.5 F | BODY MASS INDEX: 12.37 KG/M2 | RESPIRATION RATE: 18 BRPM | OXYGEN SATURATION: 96 % | DIASTOLIC BLOOD PRESSURE: 68 MMHG

## 2024-02-22 DIAGNOSIS — G24.01 NEUROLEPTIC-INDUCED TARDIVE DYSKINESIA: ICD-10-CM

## 2024-02-22 DIAGNOSIS — T43.505A NEUROLEPTIC-INDUCED TARDIVE DYSKINESIA: ICD-10-CM

## 2024-02-22 DIAGNOSIS — R63.4 WEIGHT LOSS: Primary | ICD-10-CM

## 2024-02-22 DIAGNOSIS — F17.200 TOBACCO USE DISORDER: ICD-10-CM

## 2024-02-22 DIAGNOSIS — F20.9 SCHIZOPHRENIA, UNSPECIFIED TYPE (MULTI): ICD-10-CM

## 2024-02-22 DIAGNOSIS — J43.8 OTHER EMPHYSEMA (MULTI): ICD-10-CM

## 2024-02-22 PROCEDURE — 99309 SBSQ NF CARE MODERATE MDM 30: CPT | Performed by: INTERNAL MEDICINE

## 2024-02-22 NOTE — LETTER
Patient: Lupe Serrato  : 1945    Encounter Date: 2024    Subjective  Patient ID: Lupe Serrato is a 78 y.o. female who is long term resident being seen and evaluated for multiple medical problems.    HPI   This is an 78-year-old female patient resting comfortably in her room in no distress as per usual.  She continues to go out smoke.  She has no complaints of pain or shortness of breath me.  Nursing has no new adverse events reported to me.  The patient is noted to have lost 23 pounds in the last 4 months.    Current high risk medication:  Potassium  Seroquel  Valbenazine    There are no current laboratory examinations on the chart for review at this time.    Review of Systems   Constitutional:  Negative for chills, fatigue and fever.   Respiratory:  Negative for cough and shortness of breath.    Cardiovascular:  Negative for chest pain and palpitations.   Gastrointestinal:  Negative for abdominal pain, constipation, diarrhea, nausea and vomiting.   Genitourinary:  Negative for difficulty urinating.       Objective  /68   Pulse 75   Temp 36.9 °C (98.5 °F)   Resp 18   Wt 48.5 kg (107 lb)   SpO2 96%   BMI 12.37 kg/m²     Physical Exam  Constitutional:       General: She is not in acute distress.     Comments: thin   HENT:      Head: Normocephalic and atraumatic.   Eyes:      Conjunctiva/sclera: Conjunctivae normal.   Cardiovascular:      Rate and Rhythm: Normal rate and regular rhythm.   Pulmonary:      Effort: Pulmonary effort is normal. No respiratory distress.      Breath sounds: Normal breath sounds.      Comments: Diminished t/o  Abdominal:      General: Bowel sounds are normal. There is no distension.      Palpations: Abdomen is soft.      Tenderness: There is no abdominal tenderness.   Musculoskeletal:         General: Normal range of motion.      Right lower leg: No edema.      Left lower leg: No edema.   Skin:     General: Skin is warm and dry.   Neurological:      General: No  focal deficit present.      Mental Status: She is alert and oriented to person, place, and time.   Psychiatric:         Mood and Affect: Mood normal.         Behavior: Behavior normal.         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  We will continue with restorative and supportive care as the patient tolerates    B.  The patient appears to be improved as somewhat from a neurologic standpoint having started Ingrezza for tardive dyskinesia    C.  The patient's weight loss is noted and should discontinue she would certainly be considered an appropriate candidate for hospice services    D.  The patient's prognosis is [id number].      Electronically Signed By: Devonte Mcclure MD   2/29/24  5:24 PM

## 2024-02-22 NOTE — PROGRESS NOTES
Subjective   Patient ID: Lupe Serrato is a 78 y.o. female who is long term resident being seen and evaluated for multiple medical problems.    HPI   This is an 78-year-old female patient resting comfortably in her room in no distress as per usual.  She continues to go out smoke.  She has no complaints of pain or shortness of breath me.  Nursing has no new adverse events reported to me.  The patient is noted to have lost 23 pounds in the last 4 months.    Current high risk medication:  Potassium  Seroquel  Valbenazine    There are no current laboratory examinations on the chart for review at this time.    Review of Systems   Constitutional:  Negative for chills, fatigue and fever.   Respiratory:  Negative for cough and shortness of breath.    Cardiovascular:  Negative for chest pain and palpitations.   Gastrointestinal:  Negative for abdominal pain, constipation, diarrhea, nausea and vomiting.   Genitourinary:  Negative for difficulty urinating.       Objective   /68   Pulse 75   Temp 36.9 °C (98.5 °F)   Resp 18   Wt 48.5 kg (107 lb)   SpO2 96%   BMI 12.37 kg/m²     Physical Exam  Constitutional:       General: She is not in acute distress.     Comments: thin   HENT:      Head: Normocephalic and atraumatic.   Eyes:      Conjunctiva/sclera: Conjunctivae normal.   Cardiovascular:      Rate and Rhythm: Normal rate and regular rhythm.   Pulmonary:      Effort: Pulmonary effort is normal. No respiratory distress.      Breath sounds: Normal breath sounds.      Comments: Diminished t/o  Abdominal:      General: Bowel sounds are normal. There is no distension.      Palpations: Abdomen is soft.      Tenderness: There is no abdominal tenderness.   Musculoskeletal:         General: Normal range of motion.      Right lower leg: No edema.      Left lower leg: No edema.   Skin:     General: Skin is warm and dry.   Neurological:      General: No focal deficit present.      Mental Status: She is alert and oriented to  person, place, and time.   Psychiatric:         Mood and Affect: Mood normal.         Behavior: Behavior normal.         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  We will continue with restorative and supportive care as the patient tolerates    B.  The patient appears to be improved as somewhat from a neurologic standpoint having started Ingrezza for tardive dyskinesia    C.  The patient's weight loss is noted and should discontinue she would certainly be considered an appropriate candidate for hospice services    D.  The patient's prognosis is [id number].

## 2024-02-29 ASSESSMENT — ENCOUNTER SYMPTOMS
CONSTIPATION: 0
SHORTNESS OF BREATH: 0
FATIGUE: 0
VOMITING: 0
ABDOMINAL PAIN: 0
NAUSEA: 0
DIFFICULTY URINATING: 0
PALPITATIONS: 0
FEVER: 0
CHILLS: 0
COUGH: 0
DIARRHEA: 0

## 2024-03-22 ENCOUNTER — NURSING HOME VISIT (OUTPATIENT)
Dept: POST ACUTE CARE | Facility: EXTERNAL LOCATION | Age: 79
End: 2024-03-22
Payer: COMMERCIAL

## 2024-03-22 VITALS
RESPIRATION RATE: 16 BRPM | DIASTOLIC BLOOD PRESSURE: 92 MMHG | OXYGEN SATURATION: 93 % | SYSTOLIC BLOOD PRESSURE: 129 MMHG | BODY MASS INDEX: 12.6 KG/M2 | HEART RATE: 87 BPM | TEMPERATURE: 97.1 F | WEIGHT: 109 LBS

## 2024-03-22 DIAGNOSIS — G24.01 NEUROLEPTIC-INDUCED TARDIVE DYSKINESIA: ICD-10-CM

## 2024-03-22 DIAGNOSIS — R63.4 WEIGHT LOSS, NON-INTENTIONAL: Primary | ICD-10-CM

## 2024-03-22 DIAGNOSIS — F20.9 SCHIZOPHRENIA, UNSPECIFIED TYPE (MULTI): ICD-10-CM

## 2024-03-22 DIAGNOSIS — T43.505A NEUROLEPTIC-INDUCED TARDIVE DYSKINESIA: ICD-10-CM

## 2024-03-22 DIAGNOSIS — J43.8 OTHER EMPHYSEMA (MULTI): ICD-10-CM

## 2024-03-22 PROCEDURE — 99309 SBSQ NF CARE MODERATE MDM 30: CPT | Performed by: INTERNAL MEDICINE

## 2024-03-22 NOTE — LETTER
Patient: Lupe Serrato  : 1945    Encounter Date: 2024    Subjective  Patient ID: Lupe Serrato is a 78 y.o. female who is long term resident being seen and evaluated for multiple medical problems.    HPI   This 78-year-old female patient is resting comfortably in her room in no distress whatsoever.  She has no complaints of shortness of breath or pain for me at this time.  Nursing reports no new adverse events.  Note is made of a 21 pound weight loss in the last 3 months.    Current high risk medication:  Potassium  Seroquel  Ingrezza which is a newer medicine    There are no current laboratory examinations on the chart at this time for review.    Review of Systems   Constitutional:  Negative for chills, fatigue and fever.   Respiratory:  Negative for cough and shortness of breath.    Cardiovascular:  Negative for chest pain and palpitations.   Gastrointestinal:  Negative for abdominal pain, constipation, diarrhea, nausea and vomiting.   Genitourinary:  Negative for difficulty urinating.       Objective  BP (!) 129/92   Pulse 87   Temp 36.2 °C (97.1 °F)   Resp 16   Wt 49.4 kg (109 lb)   SpO2 93%   BMI 12.60 kg/m²     Physical Exam  Constitutional:       General: She is not in acute distress.     Comments: thin   HENT:      Head: Normocephalic and atraumatic.   Eyes:      Conjunctiva/sclera: Conjunctivae normal.   Cardiovascular:      Rate and Rhythm: Normal rate and regular rhythm.   Pulmonary:      Effort: Pulmonary effort is normal. No respiratory distress.      Breath sounds: Normal breath sounds.      Comments: Diminished t/o  Abdominal:      General: Bowel sounds are normal. There is no distension.      Palpations: Abdomen is soft.      Tenderness: There is no abdominal tenderness.   Musculoskeletal:         General: Normal range of motion.      Right lower leg: No edema.      Left lower leg: No edema.   Skin:     General: Skin is warm and dry.   Neurological:      General: No focal  deficit present.      Mental Status: She is alert and oriented to person, place, and time.   Psychiatric:         Mood and Affect: Mood normal.         Behavior: Behavior normal.         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  We will continue with restorative and supportive care as the patient tolerates    B.  Laboratory examinations are ordered including a plain chest x-ray given her prolonged smoking history due to unexpected weight loss.    C.  If no obvious medical condition is found to explain her weight loss then may consider whether the new medication Ingrezza is contributing to the weight loss.  Also if okay with psychiatry consider starting the patient on mirtazapine for appetite stimulation    D.  The patient's prognosis is guarded.        Electronically Signed By: Devonte Mcclure MD   3/26/24 12:44 PM

## 2024-03-22 NOTE — PROGRESS NOTES
Subjective   Patient ID: Lupe Serrato is a 78 y.o. female who is long term resident being seen and evaluated for multiple medical problems.    HPI   This 78-year-old female patient is resting comfortably in her room in no distress whatsoever.  She has no complaints of shortness of breath or pain for me at this time.  Nursing reports no new adverse events.  Note is made of a 21 pound weight loss in the last 3 months.    Current high risk medication:  Potassium  Seroquel  Ingrezza which is a newer medicine    There are no current laboratory examinations on the chart at this time for review.    Review of Systems   Constitutional:  Negative for chills, fatigue and fever.   Respiratory:  Negative for cough and shortness of breath.    Cardiovascular:  Negative for chest pain and palpitations.   Gastrointestinal:  Negative for abdominal pain, constipation, diarrhea, nausea and vomiting.   Genitourinary:  Negative for difficulty urinating.       Objective   BP (!) 129/92   Pulse 87   Temp 36.2 °C (97.1 °F)   Resp 16   Wt 49.4 kg (109 lb)   SpO2 93%   BMI 12.60 kg/m²     Physical Exam  Constitutional:       General: She is not in acute distress.     Comments: thin   HENT:      Head: Normocephalic and atraumatic.   Eyes:      Conjunctiva/sclera: Conjunctivae normal.   Cardiovascular:      Rate and Rhythm: Normal rate and regular rhythm.   Pulmonary:      Effort: Pulmonary effort is normal. No respiratory distress.      Breath sounds: Normal breath sounds.      Comments: Diminished t/o  Abdominal:      General: Bowel sounds are normal. There is no distension.      Palpations: Abdomen is soft.      Tenderness: There is no abdominal tenderness.   Musculoskeletal:         General: Normal range of motion.      Right lower leg: No edema.      Left lower leg: No edema.   Skin:     General: Skin is warm and dry.   Neurological:      General: No focal deficit present.      Mental Status: She is alert and oriented to person,  place, and time.   Psychiatric:         Mood and Affect: Mood normal.         Behavior: Behavior normal.         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  We will continue with restorative and supportive care as the patient tolerates    B.  Laboratory examinations are ordered including a plain chest x-ray given her prolonged smoking history due to unexpected weight loss.    C.  If no obvious medical condition is found to explain her weight loss then may consider whether the new medication Ingrezza is contributing to the weight loss.  Also if okay with psychiatry consider starting the patient on mirtazapine for appetite stimulation    D.  The patient's prognosis is guarded.

## 2024-03-26 ASSESSMENT — ENCOUNTER SYMPTOMS
DIARRHEA: 0
CONSTIPATION: 0
SHORTNESS OF BREATH: 0
COUGH: 0
PALPITATIONS: 0
VOMITING: 0
FEVER: 0
CHILLS: 0
DIFFICULTY URINATING: 0
NAUSEA: 0
ABDOMINAL PAIN: 0
FATIGUE: 0

## 2024-04-18 ENCOUNTER — NURSING HOME VISIT (OUTPATIENT)
Dept: POST ACUTE CARE | Facility: EXTERNAL LOCATION | Age: 79
End: 2024-04-18
Payer: COMMERCIAL

## 2024-04-18 VITALS
TEMPERATURE: 97.4 F | RESPIRATION RATE: 16 BRPM | BODY MASS INDEX: 12.83 KG/M2 | WEIGHT: 111 LBS | HEART RATE: 94 BPM | DIASTOLIC BLOOD PRESSURE: 59 MMHG | SYSTOLIC BLOOD PRESSURE: 98 MMHG | OXYGEN SATURATION: 95 %

## 2024-04-18 DIAGNOSIS — R63.4 WEIGHT LOSS: ICD-10-CM

## 2024-04-18 DIAGNOSIS — R62.7 ADULT FAILURE TO THRIVE: Primary | ICD-10-CM

## 2024-04-18 DIAGNOSIS — I10 PRIMARY HYPERTENSION: ICD-10-CM

## 2024-04-18 DIAGNOSIS — T43.505A NEUROLEPTIC-INDUCED TARDIVE DYSKINESIA: ICD-10-CM

## 2024-04-18 DIAGNOSIS — G24.01 NEUROLEPTIC-INDUCED TARDIVE DYSKINESIA: ICD-10-CM

## 2024-04-18 DIAGNOSIS — J43.8 OTHER EMPHYSEMA (MULTI): ICD-10-CM

## 2024-04-18 PROCEDURE — 99309 SBSQ NF CARE MODERATE MDM 30: CPT | Performed by: NURSE PRACTITIONER

## 2024-04-18 ASSESSMENT — ENCOUNTER SYMPTOMS
COUGH: 0
FEVER: 0
PALPITATIONS: 0
NAUSEA: 0
DIFFICULTY URINATING: 0
FATIGUE: 0
ABDOMINAL PAIN: 0
CONSTIPATION: 0
VOMITING: 0
CHILLS: 0
SHORTNESS OF BREATH: 0
DIARRHEA: 0

## 2024-04-18 NOTE — ASSESSMENT & PLAN NOTE
Labs and cxr acceptable.  She is being followed by psychiatry and will have psychiatry team consider mirtazipine for appetite stimulation.

## 2024-04-18 NOTE — PROGRESS NOTES
Subjective   Lupe Serrato is a 78 y.o. female Here for routine monthly visit.    HPI There are no new problems and multiple health problems have been reviewed.  The course has been unchanged.  The patient  is oriented.  There are no family members present during time of visit.    she is sitting up at side of bed,  denies any complaints when asked.  Eating and drinking well per Shelia, has gained 2 lbs in the last month.   Labs and cxr were completed.   No concerns per staff.       Review of Systems   Constitutional:  Negative for chills, fatigue and fever.   Respiratory:  Negative for cough and shortness of breath.    Cardiovascular:  Negative for chest pain and palpitations.   Gastrointestinal:  Negative for abdominal pain, constipation, diarrhea, nausea and vomiting.   Genitourinary:  Negative for difficulty urinating.       Objective   BP 98/59   Pulse 94   Temp 36.3 °C (97.4 °F)   Resp 16   Wt 50.3 kg (111 lb)   SpO2 95%   BMI 12.83 kg/m²     Physical Exam  Constitutional:       General: She is not in acute distress.     Comments: thin   HENT:      Head: Normocephalic and atraumatic.   Eyes:      Conjunctiva/sclera: Conjunctivae normal.   Cardiovascular:      Rate and Rhythm: Normal rate and regular rhythm.   Pulmonary:      Effort: Pulmonary effort is normal. No respiratory distress.      Breath sounds: Normal breath sounds.      Comments: Diminished t/o  Abdominal:      General: Bowel sounds are normal. There is no distension.      Palpations: Abdomen is soft.      Tenderness: There is no abdominal tenderness.   Musculoskeletal:         General: Normal range of motion.      Right lower leg: No edema.      Left lower leg: No edema.   Skin:     General: Skin is warm and dry.   Neurological:      General: No focal deficit present.      Mental Status: She is alert and oriented to person, place, and time.   Psychiatric:         Mood and Affect: Mood normal.         Behavior: Behavior normal.          Assessment/Plan   Problem List Items Addressed This Visit       Adult failure to thrive - Primary     Labs and cxr acceptable.  She is being followed by psychiatry and will have psychiatry team consider mirtazipine for appetite stimulation.          Chronic obstructive airway disease (Multi)     no symptoms of acute exacerbation.  staff to monitor and notify for any changes.           Hypertension     Not currently on meds, continue to montior and initiate meds  based on clinical course         Neuroleptic-induced tardive dyskinesia     Follow up with psychiatry as scheduled.  Started on ingrezza          Weight loss     Weight has increased by 2 lb this month but remains underweight.   Labs and cxr completed and are unremarkable.         labs/meds/orders reviewed  staff to monitor and notify for any changes.  continue with supportive and restorative care  Will have psychiatry consider mirtazipine atg appetite stimulant dose.   Discussed with staff  Monitor weights

## 2024-04-18 NOTE — ASSESSMENT & PLAN NOTE
Weight has increased by 2 lb this month but remains underweight.   Labs and cxr completed and are unremarkable.

## 2024-04-18 NOTE — LETTER
Patient: Lupe Serrato  : 1945    Encounter Date: 2024    Subjective  Lupe Serrato is a 78 y.o. female Here for routine monthly visit.    HPI There are no new problems and multiple health problems have been reviewed.  The course has been unchanged.  The patient  is oriented.  There are no family members present during time of visit.    she is sitting up at side of bed,  denies any complaints when asked.  Eating and drinking well per Shelia, has gained 2 lbs in the last month.   Labs and cxr were completed.   No concerns per staff.       Review of Systems   Constitutional:  Negative for chills, fatigue and fever.   Respiratory:  Negative for cough and shortness of breath.    Cardiovascular:  Negative for chest pain and palpitations.   Gastrointestinal:  Negative for abdominal pain, constipation, diarrhea, nausea and vomiting.   Genitourinary:  Negative for difficulty urinating.       Objective  BP 98/59   Pulse 94   Temp 36.3 °C (97.4 °F)   Resp 16   Wt 50.3 kg (111 lb)   SpO2 95%   BMI 12.83 kg/m²     Physical Exam  Constitutional:       General: She is not in acute distress.     Comments: thin   HENT:      Head: Normocephalic and atraumatic.   Eyes:      Conjunctiva/sclera: Conjunctivae normal.   Cardiovascular:      Rate and Rhythm: Normal rate and regular rhythm.   Pulmonary:      Effort: Pulmonary effort is normal. No respiratory distress.      Breath sounds: Normal breath sounds.      Comments: Diminished t/o  Abdominal:      General: Bowel sounds are normal. There is no distension.      Palpations: Abdomen is soft.      Tenderness: There is no abdominal tenderness.   Musculoskeletal:         General: Normal range of motion.      Right lower leg: No edema.      Left lower leg: No edema.   Skin:     General: Skin is warm and dry.   Neurological:      General: No focal deficit present.      Mental Status: She is alert and oriented to person, place, and time.   Psychiatric:         Mood and  Affect: Mood normal.         Behavior: Behavior normal.         Assessment/Plan  Problem List Items Addressed This Visit       Adult failure to thrive - Primary     Labs and cxr acceptable.  She is being followed by psychiatry and will have psychiatry team consider mirtazipine for appetite stimulation.          Chronic obstructive airway disease (Multi)     no symptoms of acute exacerbation.  staff to monitor and notify for any changes.           Hypertension     Not currently on meds, continue to montior and initiate meds  based on clinical course         Neuroleptic-induced tardive dyskinesia     Follow up with psychiatry as scheduled.  Started on ingrezza          Weight loss     Weight has increased by 2 lb this month but remains underweight.   Labs and cxr completed and are unremarkable.         labs/meds/orders reviewed  staff to monitor and notify for any changes.  continue with supportive and restorative care  Will have psychiatry consider mirtazipine atg appetite stimulant dose.   Discussed with staff  Monitor weights        Electronically Signed By: BETH Victor   4/18/24  5:10 PM   no

## 2024-04-25 ENCOUNTER — NURSING HOME VISIT (OUTPATIENT)
Dept: POST ACUTE CARE | Facility: EXTERNAL LOCATION | Age: 79
End: 2024-04-25
Payer: COMMERCIAL

## 2024-04-25 VITALS
TEMPERATURE: 97.4 F | BODY MASS INDEX: 12.83 KG/M2 | SYSTOLIC BLOOD PRESSURE: 98 MMHG | WEIGHT: 111 LBS | OXYGEN SATURATION: 95 % | DIASTOLIC BLOOD PRESSURE: 59 MMHG | HEART RATE: 94 BPM | RESPIRATION RATE: 16 BRPM

## 2024-04-25 DIAGNOSIS — I10 PRIMARY HYPERTENSION: ICD-10-CM

## 2024-04-25 DIAGNOSIS — F20.9 SCHIZOPHRENIA, UNSPECIFIED TYPE (MULTI): ICD-10-CM

## 2024-04-25 DIAGNOSIS — G24.01 NEUROLEPTIC-INDUCED TARDIVE DYSKINESIA: ICD-10-CM

## 2024-04-25 DIAGNOSIS — T43.505A NEUROLEPTIC-INDUCED TARDIVE DYSKINESIA: ICD-10-CM

## 2024-04-25 DIAGNOSIS — R63.4 WEIGHT LOSS, NON-INTENTIONAL: Primary | ICD-10-CM

## 2024-04-25 DIAGNOSIS — E46 PROTEIN-CALORIE MALNUTRITION, UNSPECIFIED SEVERITY (MULTI): ICD-10-CM

## 2024-04-25 PROCEDURE — 99308 SBSQ NF CARE LOW MDM 20: CPT | Performed by: INTERNAL MEDICINE

## 2024-04-25 NOTE — LETTER
Patient: Lupe Serrato  : 1945    Encounter Date: 2024    Subjective  Patient ID: Lupe Serrato is a 78 y.o. female who is long term resident being seen and evaluated for multiple medical problems.    HPI   This 78-year-old female patient is resting comfortably in her room in no distress whatsoever.  She continues to deny complaints of pain or shortness of breath for me.  Nursing has no new adverse events reported to me.  There is no reported weight loss over the patient appears quite thin and emaciated.    Current high risk medication:  Potassium  Seroquel  Valbenazine    There appear to be no laboratory examinations on the chart at this time.    Review of Systems   Constitutional:  Negative for chills, fatigue and fever.   Respiratory:  Negative for cough and shortness of breath.    Cardiovascular:  Negative for chest pain and palpitations.   Gastrointestinal:  Negative for abdominal pain, constipation, diarrhea, nausea and vomiting.   Genitourinary:  Negative for difficulty urinating.       Objective  BP 98/59   Pulse 94   Temp 36.3 °C (97.4 °F)   Resp 16   Wt 50.3 kg (111 lb)   SpO2 95%   BMI 12.83 kg/m²     Physical Exam  Constitutional:       General: She is not in acute distress.     Comments: thin   HENT:      Head: Normocephalic and atraumatic.   Eyes:      Conjunctiva/sclera: Conjunctivae normal.   Cardiovascular:      Rate and Rhythm: Normal rate and regular rhythm.   Pulmonary:      Effort: Pulmonary effort is normal. No respiratory distress.      Breath sounds: Normal breath sounds.      Comments: Diminished t/o  Abdominal:      General: Bowel sounds are normal. There is no distension.      Palpations: Abdomen is soft.      Tenderness: There is no abdominal tenderness.   Musculoskeletal:         General: Normal range of motion.      Right lower leg: No edema.      Left lower leg: No edema.   Skin:     General: Skin is warm and dry.   Neurological:      General: No focal deficit  present.      Mental Status: She is alert and oriented to person, place, and time.   Psychiatric:         Mood and Affect: Mood normal.         Behavior: Behavior normal.         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  We will continue with restorative and supportive care as the patient tolerance    B.  Will consider repeating the patient's laboratory examinations next month if she agrees.  She has been historically uninterested in and testing    C.  The patient's prognosis is [id number].      Electronically Signed By: Devonte Mcclure MD   5/13/24  4:35 PM

## 2024-04-26 NOTE — PROGRESS NOTES
Subjective   Patient ID: Lupe Serrato is a 78 y.o. female who is long term resident being seen and evaluated for multiple medical problems.    HPI   This 78-year-old female patient is resting comfortably in her room in no distress whatsoever.  She continues to deny complaints of pain or shortness of breath for me.  Nursing has no new adverse events reported to me.  There is no reported weight loss over the patient appears quite thin and emaciated.    Current high risk medication:  Potassium  Seroquel  Valbenazine    There appear to be no laboratory examinations on the chart at this time.    Review of Systems   Constitutional:  Negative for chills, fatigue and fever.   Respiratory:  Negative for cough and shortness of breath.    Cardiovascular:  Negative for chest pain and palpitations.   Gastrointestinal:  Negative for abdominal pain, constipation, diarrhea, nausea and vomiting.   Genitourinary:  Negative for difficulty urinating.       Objective   BP 98/59   Pulse 94   Temp 36.3 °C (97.4 °F)   Resp 16   Wt 50.3 kg (111 lb)   SpO2 95%   BMI 12.83 kg/m²     Physical Exam  Constitutional:       General: She is not in acute distress.     Comments: thin   HENT:      Head: Normocephalic and atraumatic.   Eyes:      Conjunctiva/sclera: Conjunctivae normal.   Cardiovascular:      Rate and Rhythm: Normal rate and regular rhythm.   Pulmonary:      Effort: Pulmonary effort is normal. No respiratory distress.      Breath sounds: Normal breath sounds.      Comments: Diminished t/o  Abdominal:      General: Bowel sounds are normal. There is no distension.      Palpations: Abdomen is soft.      Tenderness: There is no abdominal tenderness.   Musculoskeletal:         General: Normal range of motion.      Right lower leg: No edema.      Left lower leg: No edema.   Skin:     General: Skin is warm and dry.   Neurological:      General: No focal deficit present.      Mental Status: She is alert and oriented to person, place,  and time.   Psychiatric:         Mood and Affect: Mood normal.         Behavior: Behavior normal.         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  We will continue with restorative and supportive care as the patient tolerance    B.  Will consider repeating the patient's laboratory examinations next month if she agrees.  She has been historically uninterested in and testing    C.  The patient's prognosis is [id number].

## 2024-05-13 ASSESSMENT — ENCOUNTER SYMPTOMS
DIARRHEA: 0
COUGH: 0
VOMITING: 0
FATIGUE: 0
FEVER: 0
NAUSEA: 0
CHILLS: 0
SHORTNESS OF BREATH: 0
CONSTIPATION: 0
ABDOMINAL PAIN: 0
PALPITATIONS: 0
DIFFICULTY URINATING: 0

## 2024-05-31 ENCOUNTER — NURSING HOME VISIT (OUTPATIENT)
Dept: POST ACUTE CARE | Facility: EXTERNAL LOCATION | Age: 79
End: 2024-05-31
Payer: COMMERCIAL

## 2024-05-31 VITALS
HEART RATE: 90 BPM | TEMPERATURE: 97.2 F | DIASTOLIC BLOOD PRESSURE: 53 MMHG | WEIGHT: 111 LBS | SYSTOLIC BLOOD PRESSURE: 100 MMHG | BODY MASS INDEX: 12.83 KG/M2 | OXYGEN SATURATION: 93 % | RESPIRATION RATE: 18 BRPM

## 2024-05-31 DIAGNOSIS — G24.01 NEUROLEPTIC-INDUCED TARDIVE DYSKINESIA: Primary | ICD-10-CM

## 2024-05-31 DIAGNOSIS — F20.9 SCHIZOPHRENIA, UNSPECIFIED TYPE (MULTI): ICD-10-CM

## 2024-05-31 DIAGNOSIS — T43.505A NEUROLEPTIC-INDUCED TARDIVE DYSKINESIA: Primary | ICD-10-CM

## 2024-05-31 DIAGNOSIS — R63.4 WEIGHT LOSS: ICD-10-CM

## 2024-05-31 PROCEDURE — 99308 SBSQ NF CARE LOW MDM 20: CPT | Performed by: INTERNAL MEDICINE

## 2024-05-31 NOTE — LETTER
Patient: Lupe Serrato  : 1945    Encounter Date: 2024    Subjective  Patient ID: Lupe Serrato is a 78 y.o. female who is long term resident being seen and evaluated for multiple medical problems.    HPI   This 78-year-old female patient is resting comfortably in her room in no distress.  She has no complaints at all for me.  Nursing has no new adverse events reported to me.  I asked the patient if she would allow blood testing and she asked me not to do any further testing on her.    Current high risk medication:  Seroquel  Potassium  Ingrezza  Seroquel    Laboratory examinations have been refused by the patient at this time.    Review of Systems   Constitutional:  Negative for chills, fatigue and fever.   Respiratory:  Negative for cough and shortness of breath.    Cardiovascular:  Negative for chest pain and palpitations.   Gastrointestinal:  Negative for abdominal pain, constipation, diarrhea, nausea and vomiting.   Genitourinary:  Negative for difficulty urinating.       Objective  /53   Pulse 90   Temp 36.2 °C (97.2 °F)   Resp 18   Wt 50.3 kg (111 lb)   SpO2 93%   BMI 12.83 kg/m²     Physical Exam  Constitutional:       General: She is not in acute distress.     Comments: thin   HENT:      Head: Normocephalic and atraumatic.   Eyes:      Conjunctiva/sclera: Conjunctivae normal.   Cardiovascular:      Rate and Rhythm: Normal rate and regular rhythm.   Pulmonary:      Effort: Pulmonary effort is normal. No respiratory distress.      Breath sounds: Normal breath sounds.      Comments: Diminished t/o  Abdominal:      General: Bowel sounds are normal. There is no distension.      Palpations: Abdomen is soft.      Tenderness: There is no abdominal tenderness.   Musculoskeletal:         General: Normal range of motion.      Right lower leg: No edema.      Left lower leg: No edema.   Skin:     General: Skin is warm and dry.   Neurological:      General: No focal deficit present.       Mental Status: She is alert and oriented to person, place, and time.      Comments: The patient appears to display much less tardive dyskinesia than prior to Ingrezza.   Psychiatric:         Mood and Affect: Mood normal.         Behavior: Behavior normal.         Assessment/Plan  Problem List Items Addressed This Visit             ICD-10-CM    Neuroleptic-induced tardive dyskinesia - Primary G24.01, T43.505A    Schizophrenia (Multi) F20.9    Weight loss R63.4     8.  We will continue with restorative and supportive care as the patient tolerates    B.  Laboratory examinations we will continue to offer to the patient but historically she is refused testing    C.  The patient's prognosis is poor.        Electronically Signed By: Devonte Mcclure MD   6/6/24  3:55 PM

## 2024-06-06 ASSESSMENT — ENCOUNTER SYMPTOMS
CHILLS: 0
CONSTIPATION: 0
DIARRHEA: 0
FEVER: 0
DIFFICULTY URINATING: 0
PALPITATIONS: 0
COUGH: 0
ABDOMINAL PAIN: 0
NAUSEA: 0
SHORTNESS OF BREATH: 0
VOMITING: 0
FATIGUE: 0

## 2024-06-27 ENCOUNTER — NURSING HOME VISIT (OUTPATIENT)
Dept: POST ACUTE CARE | Facility: EXTERNAL LOCATION | Age: 79
End: 2024-06-27
Payer: COMMERCIAL

## 2024-06-27 VITALS
HEART RATE: 89 BPM | DIASTOLIC BLOOD PRESSURE: 75 MMHG | TEMPERATURE: 96.5 F | RESPIRATION RATE: 16 BRPM | OXYGEN SATURATION: 91 % | SYSTOLIC BLOOD PRESSURE: 121 MMHG | WEIGHT: 112 LBS | BODY MASS INDEX: 12.94 KG/M2

## 2024-06-27 DIAGNOSIS — G24.01 NEUROLEPTIC-INDUCED TARDIVE DYSKINESIA: ICD-10-CM

## 2024-06-27 DIAGNOSIS — F20.9 SCHIZOPHRENIA, UNSPECIFIED TYPE (MULTI): ICD-10-CM

## 2024-06-27 DIAGNOSIS — T43.505A NEUROLEPTIC-INDUCED TARDIVE DYSKINESIA: ICD-10-CM

## 2024-06-27 DIAGNOSIS — F17.200 TOBACCO USE DISORDER: ICD-10-CM

## 2024-06-27 DIAGNOSIS — J43.8 OTHER EMPHYSEMA (MULTI): ICD-10-CM

## 2024-06-27 DIAGNOSIS — I10 PRIMARY HYPERTENSION: Primary | ICD-10-CM

## 2024-06-27 PROCEDURE — 99309 SBSQ NF CARE MODERATE MDM 30: CPT | Performed by: NURSE PRACTITIONER

## 2024-06-27 NOTE — LETTER
Patient: Lupe Serrato  : 1945    Encounter Date: 2024    Subjective  Lupe Serrato is a 78 y.o. female Here for routine monthly visit.    HPI There are no new problems and multiple health problems have been reviewed.  The course has been unchanged.  The patient  is oriented.  There are no family members present during time of visit.    she is sitting up at side of bed,  denies any complaints when asked.  Eating and drinking fair per staff.    No concerns per staff.       Review of Systems   Constitutional:  Negative for chills, fatigue and fever.   Respiratory:  Negative for cough and shortness of breath.    Cardiovascular:  Negative for chest pain and palpitations.   Gastrointestinal:  Negative for abdominal pain, constipation, diarrhea, nausea and vomiting.   Genitourinary:  Negative for difficulty urinating.       Objective  /75   Pulse 89   Temp 35.8 °C (96.5 °F)   Resp 16   Wt 50.8 kg (112 lb)   SpO2 91%   BMI 12.94 kg/m²     Physical Exam  Constitutional:       General: She is not in acute distress.     Comments: thin   HENT:      Head: Normocephalic and atraumatic.   Eyes:      Conjunctiva/sclera: Conjunctivae normal.   Cardiovascular:      Rate and Rhythm: Normal rate and regular rhythm.   Pulmonary:      Effort: Pulmonary effort is normal. No respiratory distress.      Breath sounds: Normal breath sounds.      Comments: Diminished t/o  Abdominal:      General: Bowel sounds are normal. There is no distension.      Palpations: Abdomen is soft.      Tenderness: There is no abdominal tenderness.   Musculoskeletal:         General: Normal range of motion.      Right lower leg: No edema.      Left lower leg: No edema.   Skin:     General: Skin is warm and dry.   Neurological:      General: No focal deficit present.      Mental Status: She is alert and oriented to person, place, and time.   Psychiatric:         Mood and Affect: Mood normal.         Behavior: Behavior normal.          Assessment/Plan  Problem List Items Addressed This Visit       Chronic obstructive airway disease (Multi)     no symptoms of acute exacerbation.  staff to monitor and notify for any changes.           Hypertension - Primary     Not currently on meds, continue to montior and initiate meds  based on clinical course         Neuroleptic-induced tardive dyskinesia     Follow up with psychiatry as scheduled.  on ingrezza          Schizophrenia (Multi)     Managed by psychiatry in the facility         Tobacco use disorder     She continues to smoke.  Previously declined smoing cessation        labs/meds/orders reviewed  staff to monitor and notify for any changes.  continue with supportive and restorative care  She completed labs 3/24, will attempt 9/24 though she normally declines all labs        Electronically Signed By: BETH Victor   6/30/24  7:47 PM

## 2024-06-27 NOTE — PROGRESS NOTES
Subjective   Lupe Serrato is a 78 y.o. female Here for routine monthly visit.    HPI There are no new problems and multiple health problems have been reviewed.  The course has been unchanged.  The patient  is oriented.  There are no family members present during time of visit.    she is sitting up at side of bed,  denies any complaints when asked.  Eating and drinking fair per staff.    No concerns per staff.       Review of Systems   Constitutional:  Negative for chills, fatigue and fever.   Respiratory:  Negative for cough and shortness of breath.    Cardiovascular:  Negative for chest pain and palpitations.   Gastrointestinal:  Negative for abdominal pain, constipation, diarrhea, nausea and vomiting.   Genitourinary:  Negative for difficulty urinating.       Objective   /75   Pulse 89   Temp 35.8 °C (96.5 °F)   Resp 16   Wt 50.8 kg (112 lb)   SpO2 91%   BMI 12.94 kg/m²     Physical Exam  Constitutional:       General: She is not in acute distress.     Comments: thin   HENT:      Head: Normocephalic and atraumatic.   Eyes:      Conjunctiva/sclera: Conjunctivae normal.   Cardiovascular:      Rate and Rhythm: Normal rate and regular rhythm.   Pulmonary:      Effort: Pulmonary effort is normal. No respiratory distress.      Breath sounds: Normal breath sounds.      Comments: Diminished t/o  Abdominal:      General: Bowel sounds are normal. There is no distension.      Palpations: Abdomen is soft.      Tenderness: There is no abdominal tenderness.   Musculoskeletal:         General: Normal range of motion.      Right lower leg: No edema.      Left lower leg: No edema.   Skin:     General: Skin is warm and dry.   Neurological:      General: No focal deficit present.      Mental Status: She is alert and oriented to person, place, and time.   Psychiatric:         Mood and Affect: Mood normal.         Behavior: Behavior normal.         Assessment/Plan   Problem List Items Addressed This Visit       Chronic  obstructive airway disease (Multi)     no symptoms of acute exacerbation.  staff to monitor and notify for any changes.           Hypertension - Primary     Not currently on meds, continue to montior and initiate meds  based on clinical course         Neuroleptic-induced tardive dyskinesia     Follow up with psychiatry as scheduled.  on ingrezza          Schizophrenia (Multi)     Managed by psychiatry in the facility         Tobacco use disorder     She continues to smoke.  Previously declined smoing cessation        labs/meds/orders reviewed  staff to monitor and notify for any changes.  continue with supportive and restorative care  She completed labs 3/24, will attempt 9/24 though she normally declines all labs

## 2024-06-30 ASSESSMENT — ENCOUNTER SYMPTOMS
CONSTIPATION: 0
DIARRHEA: 0
SHORTNESS OF BREATH: 0
DIFFICULTY URINATING: 0
NAUSEA: 0
PALPITATIONS: 0
VOMITING: 0
CHILLS: 0
ABDOMINAL PAIN: 0
FEVER: 0
FATIGUE: 0
COUGH: 0

## 2024-07-03 ENCOUNTER — NURSING HOME VISIT (OUTPATIENT)
Dept: POST ACUTE CARE | Facility: EXTERNAL LOCATION | Age: 79
End: 2024-07-03
Payer: COMMERCIAL

## 2024-07-03 DIAGNOSIS — R63.4 WEIGHT LOSS: ICD-10-CM

## 2024-07-03 DIAGNOSIS — F20.9 SCHIZOPHRENIA, UNSPECIFIED TYPE (MULTI): ICD-10-CM

## 2024-07-03 DIAGNOSIS — J43.8 OTHER EMPHYSEMA (MULTI): ICD-10-CM

## 2024-07-03 DIAGNOSIS — T43.505A NEUROLEPTIC-INDUCED TARDIVE DYSKINESIA: ICD-10-CM

## 2024-07-03 DIAGNOSIS — I10 PRIMARY HYPERTENSION: Primary | ICD-10-CM

## 2024-07-03 DIAGNOSIS — E78.5 HYPERLIPIDEMIA, UNSPECIFIED HYPERLIPIDEMIA TYPE: ICD-10-CM

## 2024-07-03 DIAGNOSIS — G24.01 NEUROLEPTIC-INDUCED TARDIVE DYSKINESIA: ICD-10-CM

## 2024-07-03 PROCEDURE — 99308 SBSQ NF CARE LOW MDM 20: CPT | Performed by: INTERNAL MEDICINE

## 2024-07-03 NOTE — LETTER
Patient: Lupe Serrato  : 1945    Encounter Date: 2024    Subjective  Patient ID: Lupe Serrato is a 78 y.o. female who is long term resident being seen and evaluated for multiple medical problems.    HPI   This 78-year-old female patient is resting comfortably in her bed in no distress.  Nursing has no new adverse events reported to me.  The patient herself has no complaints.    Current high risk medication:  Potassium  Seroquel  Valbenazine    There are no laboratory examinations on the chart for review at this time.    Note is made that the patient's weight appears to be steady.      Review of Systems   Constitutional:  Negative for chills, fatigue and fever.   Respiratory:  Negative for cough and shortness of breath.    Cardiovascular:  Negative for chest pain and palpitations.   Gastrointestinal:  Negative for abdominal pain, constipation, diarrhea, nausea and vomiting.   Genitourinary:  Negative for difficulty urinating.       Objective  /75   Pulse 89   Temp 35.8 °C (96.5 °F)   Resp 16   Wt 50.8 kg (112 lb)   SpO2 91%   BMI 12.94 kg/m²     Physical Exam  Constitutional:       General: She is not in acute distress.     Comments: thin   HENT:      Head: Normocephalic and atraumatic.   Eyes:      Conjunctiva/sclera: Conjunctivae normal.   Cardiovascular:      Rate and Rhythm: Normal rate and regular rhythm.   Pulmonary:      Effort: Pulmonary effort is normal. No respiratory distress.      Breath sounds: Normal breath sounds.      Comments: Diminished t/o  Abdominal:      General: Bowel sounds are normal. There is no distension.      Palpations: Abdomen is soft.      Tenderness: There is no abdominal tenderness.   Musculoskeletal:         General: Normal range of motion.      Right lower leg: No edema.      Left lower leg: No edema.   Skin:     General: Skin is warm and dry.   Neurological:      General: No focal deficit present.      Mental Status: She is alert and oriented to  person, place, and time.   Psychiatric:         Mood and Affect: Mood normal.         Behavior: Behavior normal.         Assessment/Plan  Problem List Items Addressed This Visit             ICD-10-CM    Chronic obstructive airway disease (Multi) J44.9    HLD (hyperlipidemia) E78.5    Hypertension - Primary I10    Neuroleptic-induced tardive dyskinesia G24.01, T43.505A    Schizophrenia (Multi) F20.9    Weight loss R63.4       8.  We will continue with restorative and supportive care as the patient tolerates    B.  Laboratory examinations will be monitored on an ongoing as-needed basis taking in a count the patient is asked for no testing    C.  Will monitor the patient's overall clinical condition blood sugar weight resume downward decline then she would certainly be considered a candidate for palliative care and/or hospice    D.  The patient's prognosis is poor.      Electronically Signed By: Devonte Mcclure MD   7/9/24 11:07 AM

## 2024-07-08 VITALS
HEART RATE: 89 BPM | OXYGEN SATURATION: 91 % | WEIGHT: 112 LBS | DIASTOLIC BLOOD PRESSURE: 75 MMHG | RESPIRATION RATE: 16 BRPM | TEMPERATURE: 96.5 F | BODY MASS INDEX: 12.94 KG/M2 | SYSTOLIC BLOOD PRESSURE: 121 MMHG

## 2024-07-08 NOTE — PROGRESS NOTES
Subjective   Patient ID: Lupe Serrato is a 78 y.o. female who is long term resident being seen and evaluated for multiple medical problems.    HPI   This 78-year-old female patient is resting comfortably in her bed in no distress.  Nursing has no new adverse events reported to me.  The patient herself has no complaints.    Current high risk medication:  Potassium  Seroquel  Valbenazine    There are no laboratory examinations on the chart for review at this time.    Note is made that the patient's weight appears to be steady.      Review of Systems   Constitutional:  Negative for chills, fatigue and fever.   Respiratory:  Negative for cough and shortness of breath.    Cardiovascular:  Negative for chest pain and palpitations.   Gastrointestinal:  Negative for abdominal pain, constipation, diarrhea, nausea and vomiting.   Genitourinary:  Negative for difficulty urinating.       Objective   /75   Pulse 89   Temp 35.8 °C (96.5 °F)   Resp 16   Wt 50.8 kg (112 lb)   SpO2 91%   BMI 12.94 kg/m²     Physical Exam  Constitutional:       General: She is not in acute distress.     Comments: thin   HENT:      Head: Normocephalic and atraumatic.   Eyes:      Conjunctiva/sclera: Conjunctivae normal.   Cardiovascular:      Rate and Rhythm: Normal rate and regular rhythm.   Pulmonary:      Effort: Pulmonary effort is normal. No respiratory distress.      Breath sounds: Normal breath sounds.      Comments: Diminished t/o  Abdominal:      General: Bowel sounds are normal. There is no distension.      Palpations: Abdomen is soft.      Tenderness: There is no abdominal tenderness.   Musculoskeletal:         General: Normal range of motion.      Right lower leg: No edema.      Left lower leg: No edema.   Skin:     General: Skin is warm and dry.   Neurological:      General: No focal deficit present.      Mental Status: She is alert and oriented to person, place, and time.   Psychiatric:         Mood and Affect: Mood  normal.         Behavior: Behavior normal.         Assessment/Plan   Problem List Items Addressed This Visit             ICD-10-CM    Chronic obstructive airway disease (Multi) J44.9    HLD (hyperlipidemia) E78.5    Hypertension - Primary I10    Neuroleptic-induced tardive dyskinesia G24.01, T43.505A    Schizophrenia (Multi) F20.9    Weight loss R63.4       8.  We will continue with restorative and supportive care as the patient tolerates    B.  Laboratory examinations will be monitored on an ongoing as-needed basis taking in a count the patient is asked for no testing    C.  Will monitor the patient's overall clinical condition blood sugar weight resume downward decline then she would certainly be considered a candidate for palliative care and/or hospice    D.  The patient's prognosis is poor.

## 2024-07-09 ASSESSMENT — ENCOUNTER SYMPTOMS
CHILLS: 0
CONSTIPATION: 0
PALPITATIONS: 0
FATIGUE: 0
SHORTNESS OF BREATH: 0
COUGH: 0
ABDOMINAL PAIN: 0
FEVER: 0
DIFFICULTY URINATING: 0
VOMITING: 0
NAUSEA: 0
DIARRHEA: 0

## 2024-08-02 ENCOUNTER — NURSING HOME VISIT (OUTPATIENT)
Dept: POST ACUTE CARE | Facility: EXTERNAL LOCATION | Age: 79
End: 2024-08-02
Payer: COMMERCIAL

## 2024-08-02 VITALS
HEART RATE: 88 BPM | OXYGEN SATURATION: 90 % | DIASTOLIC BLOOD PRESSURE: 66 MMHG | TEMPERATURE: 97.2 F | SYSTOLIC BLOOD PRESSURE: 102 MMHG | BODY MASS INDEX: 12.25 KG/M2 | WEIGHT: 106 LBS | RESPIRATION RATE: 18 BRPM

## 2024-08-02 DIAGNOSIS — F20.9 SCHIZOPHRENIA, UNSPECIFIED TYPE (MULTI): ICD-10-CM

## 2024-08-02 DIAGNOSIS — I10 PRIMARY HYPERTENSION: Primary | ICD-10-CM

## 2024-08-02 DIAGNOSIS — G24.01 NEUROLEPTIC-INDUCED TARDIVE DYSKINESIA: ICD-10-CM

## 2024-08-02 DIAGNOSIS — R63.4 WEIGHT LOSS, NON-INTENTIONAL: ICD-10-CM

## 2024-08-02 DIAGNOSIS — T43.505A NEUROLEPTIC-INDUCED TARDIVE DYSKINESIA: ICD-10-CM

## 2024-08-02 DIAGNOSIS — E78.5 HYPERLIPIDEMIA, UNSPECIFIED HYPERLIPIDEMIA TYPE: ICD-10-CM

## 2024-08-02 PROCEDURE — 99308 SBSQ NF CARE LOW MDM 20: CPT | Performed by: INTERNAL MEDICINE

## 2024-08-02 NOTE — LETTER
Patient: Lupe Serrato  : 1945    Encounter Date: 2024    Subjective  Patient ID: Lupe Serrato is a 79 y.o. female who is long term resident being seen and evaluated for multiple medical problems.    HPI   This 79-year-old female patient is resting comfortably in her room in no distress.  She has no complaints of pain or shortness of breath per usual.  Nursing has no new adverse events reported to me.    Current high risk medication:  Potassium  Seroquel  Ingrezza    Laboratory examinations have been deferred due to frequent and recurrent patient refusal for lab services    Review of Systems   Constitutional:  Negative for chills, fatigue and fever.   Respiratory:  Negative for cough and shortness of breath.    Cardiovascular:  Negative for chest pain and palpitations.   Gastrointestinal:  Negative for abdominal pain, constipation, diarrhea, nausea and vomiting.   Genitourinary:  Negative for difficulty urinating.       Objective  /66   Pulse 88   Temp 36.2 °C (97.2 °F)   Resp 18   Wt 48.1 kg (106 lb)   SpO2 90%   BMI 12.25 kg/m²     Physical Exam  Constitutional:       General: She is not in acute distress.     Comments: thin   HENT:      Head: Normocephalic and atraumatic.   Eyes:      Conjunctiva/sclera: Conjunctivae normal.   Cardiovascular:      Rate and Rhythm: Normal rate and regular rhythm.   Pulmonary:      Effort: Pulmonary effort is normal. No respiratory distress.      Breath sounds: Normal breath sounds.      Comments: Diminished t/o  Abdominal:      General: Bowel sounds are normal. There is no distension.      Palpations: Abdomen is soft.      Tenderness: There is no abdominal tenderness.   Musculoskeletal:         General: Normal range of motion.      Right lower leg: No edema.      Left lower leg: No edema.   Skin:     General: Skin is warm and dry.   Neurological:      General: No focal deficit present.      Mental Status: She is alert and oriented to person, place,  and time.   Psychiatric:         Mood and Affect: Mood normal.         Behavior: Behavior normal.         Assessment/Plan  Problem List Items Addressed This Visit             ICD-10-CM    HLD (hyperlipidemia) E78.5    Hypertension - Primary I10    Neuroleptic-induced tardive dyskinesia G24.01, T43.505A    Schizophrenia (Multi) F20.9    Weight loss, non-intentional R63.4       8.  We will continue with restorative and supportive care as the patient tolerates    B.  Laboratory examinations will not be ordered because the patient does not want them and is refused multiple times in the past.  She is consistently asked to not be intervened upon with regard to testing and treatment.    C.  The patient's prognosis is [id number]      Electronically Signed By: Devonte Mcclure MD   8/14/24  5:24 PM

## 2024-08-02 NOTE — PROGRESS NOTES
Subjective   Patient ID: Lupe Serrato is a 79 y.o. female who is long term resident being seen and evaluated for multiple medical problems.    HPI   This 79-year-old female patient is resting comfortably in her room in no distress.  She has no complaints of pain or shortness of breath per usual.  Nursing has no new adverse events reported to me.    Current high risk medication:  Potassium  Seroquel  Ingrezza    Laboratory examinations have been deferred due to frequent and recurrent patient refusal for lab services    Review of Systems   Constitutional:  Negative for chills, fatigue and fever.   Respiratory:  Negative for cough and shortness of breath.    Cardiovascular:  Negative for chest pain and palpitations.   Gastrointestinal:  Negative for abdominal pain, constipation, diarrhea, nausea and vomiting.   Genitourinary:  Negative for difficulty urinating.       Objective   /66   Pulse 88   Temp 36.2 °C (97.2 °F)   Resp 18   Wt 48.1 kg (106 lb)   SpO2 90%   BMI 12.25 kg/m²     Physical Exam  Constitutional:       General: She is not in acute distress.     Comments: thin   HENT:      Head: Normocephalic and atraumatic.   Eyes:      Conjunctiva/sclera: Conjunctivae normal.   Cardiovascular:      Rate and Rhythm: Normal rate and regular rhythm.   Pulmonary:      Effort: Pulmonary effort is normal. No respiratory distress.      Breath sounds: Normal breath sounds.      Comments: Diminished t/o  Abdominal:      General: Bowel sounds are normal. There is no distension.      Palpations: Abdomen is soft.      Tenderness: There is no abdominal tenderness.   Musculoskeletal:         General: Normal range of motion.      Right lower leg: No edema.      Left lower leg: No edema.   Skin:     General: Skin is warm and dry.   Neurological:      General: No focal deficit present.      Mental Status: She is alert and oriented to person, place, and time.   Psychiatric:         Mood and Affect: Mood normal.          Behavior: Behavior normal.         Assessment/Plan   Problem List Items Addressed This Visit             ICD-10-CM    HLD (hyperlipidemia) E78.5    Hypertension - Primary I10    Neuroleptic-induced tardive dyskinesia G24.01, T43.505A    Schizophrenia (Multi) F20.9    Weight loss, non-intentional R63.4       8.  We will continue with restorative and supportive care as the patient tolerates    B.  Laboratory examinations will not be ordered because the patient does not want them and is refused multiple times in the past.  She is consistently asked to not be intervened upon with regard to testing and treatment.    C.  The patient's prognosis is [id number]

## 2024-08-14 ASSESSMENT — ENCOUNTER SYMPTOMS
FATIGUE: 0
CHILLS: 0
VOMITING: 0
FEVER: 0
COUGH: 0
NAUSEA: 0
ABDOMINAL PAIN: 0
SHORTNESS OF BREATH: 0
DIFFICULTY URINATING: 0
PALPITATIONS: 0
DIARRHEA: 0
CONSTIPATION: 0

## 2024-09-06 ENCOUNTER — NURSING HOME VISIT (OUTPATIENT)
Dept: POST ACUTE CARE | Facility: EXTERNAL LOCATION | Age: 79
End: 2024-09-06
Payer: COMMERCIAL

## 2024-09-06 VITALS
HEART RATE: 70 BPM | WEIGHT: 102 LBS | BODY MASS INDEX: 11.79 KG/M2 | DIASTOLIC BLOOD PRESSURE: 74 MMHG | SYSTOLIC BLOOD PRESSURE: 129 MMHG | OXYGEN SATURATION: 98 % | RESPIRATION RATE: 18 BRPM | TEMPERATURE: 98 F

## 2024-09-06 DIAGNOSIS — E46 PROTEIN-CALORIE MALNUTRITION, UNSPECIFIED SEVERITY (MULTI): ICD-10-CM

## 2024-09-06 DIAGNOSIS — T43.505A NEUROLEPTIC-INDUCED TARDIVE DYSKINESIA: ICD-10-CM

## 2024-09-06 DIAGNOSIS — G24.01 NEUROLEPTIC-INDUCED TARDIVE DYSKINESIA: ICD-10-CM

## 2024-09-06 DIAGNOSIS — I10 PRIMARY HYPERTENSION: ICD-10-CM

## 2024-09-06 DIAGNOSIS — F20.9 SCHIZOPHRENIA, UNSPECIFIED TYPE: ICD-10-CM

## 2024-09-06 DIAGNOSIS — J43.8 OTHER EMPHYSEMA (MULTI): ICD-10-CM

## 2024-09-06 DIAGNOSIS — F17.200 TOBACCO USE DISORDER: ICD-10-CM

## 2024-09-06 DIAGNOSIS — R63.4 WEIGHT LOSS, NON-INTENTIONAL: Primary | ICD-10-CM

## 2024-09-06 PROCEDURE — 99308 SBSQ NF CARE LOW MDM 20: CPT | Performed by: INTERNAL MEDICINE

## 2024-09-06 NOTE — PROGRESS NOTES
Subjective   Patient ID: Lupe Serrato is a 79 y.o. female who is long term resident being seen and evaluated for multiple medical problems.    HPI   This 79-year-old female patient is resting comfortably in her room in no distress whatsoever.  She has no complaints for me at this time.  Nursing has no new adverse events fortunately.    Current high risk medication:  Potassium  Seroquel  Valbenazine    Laboratory examinations are not done as the patient refuses them.    Note is made of a 10.4 pound weight loss since June 2024.    Review of Systems   Constitutional:  Negative for chills, fatigue and fever.   Respiratory:  Negative for cough and shortness of breath.    Cardiovascular:  Negative for chest pain and palpitations.   Gastrointestinal:  Negative for abdominal pain, constipation, diarrhea, nausea and vomiting.   Genitourinary:  Negative for difficulty urinating.       Objective   /74   Pulse 70   Temp 36.7 °C (98 °F)   Resp 18   Wt 46.3 kg (102 lb)   SpO2 98%   BMI 11.79 kg/m²     Physical Exam  Constitutional:       General: She is not in acute distress.     Comments: thin   HENT:      Head: Normocephalic and atraumatic.   Eyes:      Conjunctiva/sclera: Conjunctivae normal.   Cardiovascular:      Rate and Rhythm: Normal rate and regular rhythm.   Pulmonary:      Effort: Pulmonary effort is normal. No respiratory distress.      Breath sounds: Normal breath sounds.      Comments: Diminished t/o  Abdominal:      General: Bowel sounds are normal. There is no distension.      Palpations: Abdomen is soft.      Tenderness: There is no abdominal tenderness.   Musculoskeletal:         General: Normal range of motion.      Right lower leg: No edema.      Left lower leg: No edema.   Skin:     General: Skin is warm and dry.   Neurological:      General: No focal deficit present.      Mental Status: She is alert and oriented to person, place, and time.   Psychiatric:         Mood and Affect: Mood normal.          Behavior: Behavior normal.         Assessment/Plan   Problem List Items Addressed This Visit             ICD-10-CM    Chronic obstructive airway disease (Multi) J44.9    Hypertension I10    Neuroleptic-induced tardive dyskinesia G24.01, T43.505A    Schizophrenia (Multi) F20.9    Tobacco use disorder F17.200    Weight loss, non-intentional - Primary R63.4    Protein-calorie malnutrition, unspecified severity (Multi) E46     8.  We will continue with restorative and supportive care as the patient tolerates    B.  Laboratory examinations will continue to be offered however I suspect they will continue to be refused by the patient    C.  The patient's prognosis is [id number].

## 2024-09-06 NOTE — LETTER
Patient: Lupe Serrato  : 1945    Encounter Date: 2024    Subjective  Patient ID: Lpue Serrato is a 79 y.o. female who is long term resident being seen and evaluated for multiple medical problems.    HPI   This 79-year-old female patient is resting comfortably in her room in no distress whatsoever.  She has no complaints for me at this time.  Nursing has no new adverse events fortunately.    Current high risk medication:  Potassium  Seroquel  Valbenazine    Laboratory examinations are not done as the patient refuses them.    Note is made of a 10.4 pound weight loss since 2024.    Review of Systems   Constitutional:  Negative for chills, fatigue and fever.   Respiratory:  Negative for cough and shortness of breath.    Cardiovascular:  Negative for chest pain and palpitations.   Gastrointestinal:  Negative for abdominal pain, constipation, diarrhea, nausea and vomiting.   Genitourinary:  Negative for difficulty urinating.       Objective  /74   Pulse 70   Temp 36.7 °C (98 °F)   Resp 18   Wt 46.3 kg (102 lb)   SpO2 98%   BMI 11.79 kg/m²     Physical Exam  Constitutional:       General: She is not in acute distress.     Comments: thin   HENT:      Head: Normocephalic and atraumatic.   Eyes:      Conjunctiva/sclera: Conjunctivae normal.   Cardiovascular:      Rate and Rhythm: Normal rate and regular rhythm.   Pulmonary:      Effort: Pulmonary effort is normal. No respiratory distress.      Breath sounds: Normal breath sounds.      Comments: Diminished t/o  Abdominal:      General: Bowel sounds are normal. There is no distension.      Palpations: Abdomen is soft.      Tenderness: There is no abdominal tenderness.   Musculoskeletal:         General: Normal range of motion.      Right lower leg: No edema.      Left lower leg: No edema.   Skin:     General: Skin is warm and dry.   Neurological:      General: No focal deficit present.      Mental Status: She is alert and oriented to person,  place, and time.   Psychiatric:         Mood and Affect: Mood normal.         Behavior: Behavior normal.         Assessment/Plan  Problem List Items Addressed This Visit             ICD-10-CM    Chronic obstructive airway disease (Multi) J44.9    Hypertension I10    Neuroleptic-induced tardive dyskinesia G24.01, T43.505A    Schizophrenia (Multi) F20.9    Tobacco use disorder F17.200    Weight loss, non-intentional - Primary R63.4    Protein-calorie malnutrition, unspecified severity (Multi) E46     8.  We will continue with restorative and supportive care as the patient tolerates    B.  Laboratory examinations will continue to be offered however I suspect they will continue to be refused by the patient    C.  The patient's prognosis is [id number].        Electronically Signed By: Devonte Mcclure MD   9/24/24 11:47 AM

## 2024-09-10 ENCOUNTER — NURSING HOME VISIT (OUTPATIENT)
Dept: POST ACUTE CARE | Facility: EXTERNAL LOCATION | Age: 79
End: 2024-09-10
Payer: COMMERCIAL

## 2024-09-10 VITALS
WEIGHT: 102 LBS | OXYGEN SATURATION: 96 % | TEMPERATURE: 97.6 F | SYSTOLIC BLOOD PRESSURE: 129 MMHG | BODY MASS INDEX: 11.79 KG/M2 | RESPIRATION RATE: 18 BRPM | DIASTOLIC BLOOD PRESSURE: 74 MMHG | HEART RATE: 70 BPM

## 2024-09-10 DIAGNOSIS — R62.7 ADULT FAILURE TO THRIVE: ICD-10-CM

## 2024-09-10 DIAGNOSIS — J43.8 OTHER EMPHYSEMA (MULTI): ICD-10-CM

## 2024-09-10 DIAGNOSIS — I10 PRIMARY HYPERTENSION: Primary | ICD-10-CM

## 2024-09-10 DIAGNOSIS — F17.200 TOBACCO USE DISORDER: ICD-10-CM

## 2024-09-10 DIAGNOSIS — R63.4 WEIGHT LOSS, NON-INTENTIONAL: ICD-10-CM

## 2024-09-10 DIAGNOSIS — F20.9 SCHIZOPHRENIA, UNSPECIFIED TYPE (MULTI): ICD-10-CM

## 2024-09-10 NOTE — LETTER
Patient: Lupe Serrato  : 1945    Encounter Date: 09/10/2024    Subjective  Lupe Serrato is a 79 y.o. female Here for routine visit.    HPI There are no new problems and multiple health problems have been reviewed.  The course has been unchanged.  The patient  is oriented.  There are no family members present during time of visit.    she is sitting up at side of bed,  denies any complaints when asked.  Eating and drinking fair per staff and she has lost more weight.   No concerns per staff.       Review of Systems   Constitutional:  Negative for chills, fatigue and fever.   Respiratory:  Negative for cough and shortness of breath.    Cardiovascular:  Negative for chest pain and palpitations.   Gastrointestinal:  Negative for abdominal pain, constipation, diarrhea, nausea and vomiting.   Genitourinary:  Negative for difficulty urinating.       Objective  /74   Pulse 70   Temp 36.4 °C (97.6 °F)   Resp 18   Wt 46.3 kg (102 lb)   SpO2 96%   BMI 11.79 kg/m²     Physical Exam  Constitutional:       General: She is not in acute distress.     Comments: thin   HENT:      Head: Normocephalic and atraumatic.   Eyes:      Conjunctiva/sclera: Conjunctivae normal.   Cardiovascular:      Rate and Rhythm: Normal rate and regular rhythm.   Pulmonary:      Effort: Pulmonary effort is normal. No respiratory distress.      Breath sounds: Normal breath sounds.      Comments: Diminished t/o  Abdominal:      General: Bowel sounds are normal. There is no distension.      Palpations: Abdomen is soft.      Tenderness: There is no abdominal tenderness.   Musculoskeletal:         General: Normal range of motion.      Right lower leg: No edema.      Left lower leg: No edema.   Skin:     General: Skin is warm and dry.   Neurological:      General: No focal deficit present.      Mental Status: She is alert and oriented to person, place, and time.   Psychiatric:         Mood and Affect: Mood normal.         Behavior:  Behavior normal.         Assessment/Plan  Problem List Items Addressed This Visit       Adult failure to thrive     She has lost more weight,on mirtazipine for appetite stimulant.  She is being followed by psychiatry          Chronic obstructive airway disease (Multi)     no symptoms of acute exacerbation.  staff to monitor and notify for any changes.           Hypertension - Primary     Not currently on meds, continue to montior and initiate meds  based on clinical course         Schizophrenia (Multi)     Managed by psychiatry in the facility         Tobacco use disorder     She continues to smoke.  Previously declined smoing cessation         Weight loss, non-intentional     On supplements, eating fair per staff, will resume weekly weights to monitor.  On mirtazipine for appetite stimulant.          labs/meds/orders reviewed  staff to monitor and notify for any changes.  continue with supportive and restorative care  She declines labs  Place on weekly weights to monitor        Electronically Signed By: BETH Victor   9/14/24  9:37 AM

## 2024-09-10 NOTE — PROGRESS NOTES
Subjective   Lupe Serrato is a 79 y.o. female Here for routine visit.    HPI There are no new problems and multiple health problems have been reviewed.  The course has been unchanged.  The patient  is oriented.  There are no family members present during time of visit.    she is sitting up at side of bed,  denies any complaints when asked.  Eating and drinking fair per staff and she has lost more weight.   No concerns per staff.       Review of Systems   Constitutional:  Negative for chills, fatigue and fever.   Respiratory:  Negative for cough and shortness of breath.    Cardiovascular:  Negative for chest pain and palpitations.   Gastrointestinal:  Negative for abdominal pain, constipation, diarrhea, nausea and vomiting.   Genitourinary:  Negative for difficulty urinating.       Objective   /74   Pulse 70   Temp 36.4 °C (97.6 °F)   Resp 18   Wt 46.3 kg (102 lb)   SpO2 96%   BMI 11.79 kg/m²     Physical Exam  Constitutional:       General: She is not in acute distress.     Comments: thin   HENT:      Head: Normocephalic and atraumatic.   Eyes:      Conjunctiva/sclera: Conjunctivae normal.   Cardiovascular:      Rate and Rhythm: Normal rate and regular rhythm.   Pulmonary:      Effort: Pulmonary effort is normal. No respiratory distress.      Breath sounds: Normal breath sounds.      Comments: Diminished t/o  Abdominal:      General: Bowel sounds are normal. There is no distension.      Palpations: Abdomen is soft.      Tenderness: There is no abdominal tenderness.   Musculoskeletal:         General: Normal range of motion.      Right lower leg: No edema.      Left lower leg: No edema.   Skin:     General: Skin is warm and dry.   Neurological:      General: No focal deficit present.      Mental Status: She is alert and oriented to person, place, and time.   Psychiatric:         Mood and Affect: Mood normal.         Behavior: Behavior normal.         Assessment/Plan   Problem List Items Addressed This  Visit       Adult failure to thrive     She has lost more weight,on mirtazipine for appetite stimulant.  She is being followed by psychiatry          Chronic obstructive airway disease (Multi)     no symptoms of acute exacerbation.  staff to monitor and notify for any changes.           Hypertension - Primary     Not currently on meds, continue to montior and initiate meds  based on clinical course         Schizophrenia (Multi)     Managed by psychiatry in the facility         Tobacco use disorder     She continues to smoke.  Previously declined smoing cessation         Weight loss, non-intentional     On supplements, eating fair per staff, will resume weekly weights to monitor.  On mirtazipine for appetite stimulant.          labs/meds/orders reviewed  staff to monitor and notify for any changes.  continue with supportive and restorative care  She declines labs  Place on weekly weights to monitor

## 2024-09-14 ASSESSMENT — ENCOUNTER SYMPTOMS
FATIGUE: 0
SHORTNESS OF BREATH: 0
FEVER: 0
DIARRHEA: 0
DIFFICULTY URINATING: 0
ABDOMINAL PAIN: 0
COUGH: 0
NAUSEA: 0
PALPITATIONS: 0
CONSTIPATION: 0
CHILLS: 0
VOMITING: 0

## 2024-09-14 NOTE — ASSESSMENT & PLAN NOTE
She has lost more weight,on mirtazipine for appetite stimulant.  She is being followed by psychiatry

## 2024-09-14 NOTE — ASSESSMENT & PLAN NOTE
On supplements, eating fair per staff, will resume weekly weights to monitor.  On mirtazipine for appetite stimulant.

## 2024-09-24 ASSESSMENT — ENCOUNTER SYMPTOMS
SHORTNESS OF BREATH: 0
COUGH: 0
FEVER: 0
FATIGUE: 0
NAUSEA: 0
CONSTIPATION: 0
DIFFICULTY URINATING: 0
PALPITATIONS: 0
CHILLS: 0
ABDOMINAL PAIN: 0
VOMITING: 0
DIARRHEA: 0

## 2024-10-10 ENCOUNTER — NURSING HOME VISIT (OUTPATIENT)
Dept: POST ACUTE CARE | Facility: EXTERNAL LOCATION | Age: 79
End: 2024-10-10
Payer: COMMERCIAL

## 2024-10-10 VITALS
BODY MASS INDEX: 11.79 KG/M2 | OXYGEN SATURATION: 96 % | HEART RATE: 69 BPM | DIASTOLIC BLOOD PRESSURE: 74 MMHG | SYSTOLIC BLOOD PRESSURE: 129 MMHG | WEIGHT: 102 LBS | RESPIRATION RATE: 16 BRPM | TEMPERATURE: 97.8 F

## 2024-10-10 DIAGNOSIS — R63.4 WEIGHT LOSS, NON-INTENTIONAL: ICD-10-CM

## 2024-10-10 DIAGNOSIS — G24.01 NEUROLEPTIC-INDUCED TARDIVE DYSKINESIA: ICD-10-CM

## 2024-10-10 DIAGNOSIS — T43.505A NEUROLEPTIC-INDUCED TARDIVE DYSKINESIA: ICD-10-CM

## 2024-10-10 DIAGNOSIS — F20.9 SCHIZOPHRENIA, UNSPECIFIED TYPE: ICD-10-CM

## 2024-10-10 DIAGNOSIS — E46 PROTEIN-CALORIE MALNUTRITION, UNSPECIFIED SEVERITY (MULTI): ICD-10-CM

## 2024-10-10 DIAGNOSIS — R62.7 ADULT FAILURE TO THRIVE: Primary | ICD-10-CM

## 2024-10-10 PROCEDURE — 99308 SBSQ NF CARE LOW MDM 20: CPT | Performed by: INTERNAL MEDICINE

## 2024-10-10 NOTE — PROGRESS NOTES
Subjective   Patient ID: Lupe Serrato is a 79 y.o. female who is long term resident being seen and evaluated for multiple medical problems.    HPI   This 79-year-old female patient is resting comfortably in common area finishing lunch.  She has no complaints for me today.  She is recovering from a recurrent COVID infection.  She appears cachectic and to have lost weight.  Nursing has no adverse events reported to me.    Current high risk medication:  Potassium  Seroquel  Valbenazine    There are no laboratory examinations on the chart review at this time as the patient has historically refused.    Review of Systems   Constitutional:  Negative for chills, fatigue and fever.   Respiratory:  Negative for cough and shortness of breath.    Cardiovascular:  Negative for chest pain and palpitations.   Gastrointestinal:  Negative for abdominal pain, constipation, diarrhea, nausea and vomiting.   Genitourinary:  Negative for difficulty urinating.       Objective   /74   Pulse 69   Temp 36.6 °C (97.8 °F)   Resp 16   Wt 46.3 kg (102 lb)   SpO2 96%   BMI 11.79 kg/m²     Physical Exam  Constitutional:       General: She is not in acute distress.     Comments: thin   HENT:      Head: Normocephalic and atraumatic.   Eyes:      Conjunctiva/sclera: Conjunctivae normal.   Cardiovascular:      Rate and Rhythm: Normal rate and regular rhythm.   Pulmonary:      Effort: Pulmonary effort is normal. No respiratory distress.      Breath sounds: Normal breath sounds.      Comments: Diminished t/o  Abdominal:      General: Bowel sounds are normal. There is no distension.      Palpations: Abdomen is soft.      Tenderness: There is no abdominal tenderness.   Musculoskeletal:         General: Normal range of motion.      Right lower leg: No edema.      Left lower leg: No edema.   Skin:     General: Skin is warm and dry.   Neurological:      General: No focal deficit present.      Mental Status: She is alert and oriented to person,  place, and time.   Psychiatric:         Mood and Affect: Mood normal.         Behavior: Behavior normal.         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  We will continue with restorative and supportive care as the patient tolerates    B.  Laboratory examinations will be monitored to the patient agree and comply.  With regard to her cachexia and obvious protein calorie malnutrition she would be a good candidate for palliative care and/or hospice should she and her family wish to move in that direction    C.  The patient's prognosis is poor.

## 2024-10-10 NOTE — LETTER
Patient: Lupe Serrato  : 1945    Encounter Date: 10/10/2024    Subjective  Patient ID: Lupe Serrato is a 79 y.o. female who is long term resident being seen and evaluated for multiple medical problems.    HPI   This 79-year-old female patient is resting comfortably in common area finishing lunch.  She has no complaints for me today.  She is recovering from a recurrent COVID infection.  She appears cachectic and to have lost weight.  Nursing has no adverse events reported to me.    Current high risk medication:  Potassium  Seroquel  Valbenazine    There are no laboratory examinations on the chart review at this time as the patient has historically refused.    Review of Systems   Constitutional:  Negative for chills, fatigue and fever.   Respiratory:  Negative for cough and shortness of breath.    Cardiovascular:  Negative for chest pain and palpitations.   Gastrointestinal:  Negative for abdominal pain, constipation, diarrhea, nausea and vomiting.   Genitourinary:  Negative for difficulty urinating.       Objective  /74   Pulse 69   Temp 36.6 °C (97.8 °F)   Resp 16   Wt 46.3 kg (102 lb)   SpO2 96%   BMI 11.79 kg/m²     Physical Exam  Constitutional:       General: She is not in acute distress.     Comments: thin   HENT:      Head: Normocephalic and atraumatic.   Eyes:      Conjunctiva/sclera: Conjunctivae normal.   Cardiovascular:      Rate and Rhythm: Normal rate and regular rhythm.   Pulmonary:      Effort: Pulmonary effort is normal. No respiratory distress.      Breath sounds: Normal breath sounds.      Comments: Diminished t/o  Abdominal:      General: Bowel sounds are normal. There is no distension.      Palpations: Abdomen is soft.      Tenderness: There is no abdominal tenderness.   Musculoskeletal:         General: Normal range of motion.      Right lower leg: No edema.      Left lower leg: No edema.   Skin:     General: Skin is warm and dry.   Neurological:      General: No focal  deficit present.      Mental Status: She is alert and oriented to person, place, and time.   Psychiatric:         Mood and Affect: Mood normal.         Behavior: Behavior normal.         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  We will continue with restorative and supportive care as the patient tolerates    B.  Laboratory examinations will be monitored to the patient agree and comply.  With regard to her cachexia and obvious protein calorie malnutrition she would be a good candidate for palliative care and/or hospice should she and her family wish to move in that direction    C.  The patient's prognosis is poor.        Electronically Signed By: Devonte Mcclure MD   10/23/24  5:56 PM

## 2024-10-23 ASSESSMENT — ENCOUNTER SYMPTOMS
COUGH: 0
DIFFICULTY URINATING: 0
NAUSEA: 0
CONSTIPATION: 0
CHILLS: 0
ABDOMINAL PAIN: 0
FEVER: 0
PALPITATIONS: 0
DIARRHEA: 0
SHORTNESS OF BREATH: 0
FATIGUE: 0
VOMITING: 0

## 2024-11-05 ENCOUNTER — NURSING HOME VISIT (OUTPATIENT)
Dept: POST ACUTE CARE | Facility: EXTERNAL LOCATION | Age: 79
End: 2024-11-05
Payer: COMMERCIAL

## 2024-11-05 VITALS
RESPIRATION RATE: 18 BRPM | TEMPERATURE: 97.6 F | DIASTOLIC BLOOD PRESSURE: 65 MMHG | HEART RATE: 75 BPM | SYSTOLIC BLOOD PRESSURE: 120 MMHG | WEIGHT: 95 LBS | OXYGEN SATURATION: 96 % | BODY MASS INDEX: 10.98 KG/M2

## 2024-11-05 DIAGNOSIS — F20.3 UNDIFFERENTIATED SCHIZOPHRENIA (MULTI): ICD-10-CM

## 2024-11-05 DIAGNOSIS — R63.4 WEIGHT LOSS, NON-INTENTIONAL: ICD-10-CM

## 2024-11-05 DIAGNOSIS — E53.8 VITAMIN B12 DEFICIENCY: ICD-10-CM

## 2024-11-05 DIAGNOSIS — J43.9 PULMONARY EMPHYSEMA, UNSPECIFIED EMPHYSEMA TYPE (MULTI): Primary | ICD-10-CM

## 2024-11-05 DIAGNOSIS — F17.200 TOBACCO USE DISORDER: ICD-10-CM

## 2024-11-05 DIAGNOSIS — E55.9 VITAMIN D DEFICIENCY: ICD-10-CM

## 2024-11-05 PROCEDURE — 99309 SBSQ NF CARE MODERATE MDM 30: CPT | Performed by: NURSE PRACTITIONER

## 2024-11-05 NOTE — PROGRESS NOTES
Subjective   Lupe Serrato is a 79 y.o. female Here for routine visit.    HPI There are no new problems and multiple health problems have been reviewed.  The course has been unchanged.  The patient  is oriented.  There are no family members present during time of visit.    she is sitting up at side of bed,  denies any complaints when asked.  Eating and drinking fair per staff and she has lost more weight, she declines work up for this including labs. .   No concerns per staff.       Review of Systems   Constitutional:  Negative for chills, fatigue and fever.   Respiratory:  Negative for cough and shortness of breath.    Cardiovascular:  Negative for chest pain and palpitations.   Gastrointestinal:  Negative for abdominal pain, constipation, diarrhea, nausea and vomiting.   Genitourinary:  Negative for difficulty urinating.       Objective   /65   Pulse 75   Temp 36.4 °C (97.6 °F)   Resp 18   Wt (!) 43.1 kg (95 lb)   SpO2 96%   BMI 10.98 kg/m²     Physical Exam  Constitutional:       General: She is not in acute distress.     Comments: thin   HENT:      Head: Normocephalic and atraumatic.   Eyes:      Conjunctiva/sclera: Conjunctivae normal.   Cardiovascular:      Rate and Rhythm: Normal rate and regular rhythm.   Pulmonary:      Effort: Pulmonary effort is normal. No respiratory distress.      Breath sounds: Normal breath sounds.      Comments: Diminished t/o  Abdominal:      General: Bowel sounds are normal. There is no distension.      Palpations: Abdomen is soft.      Tenderness: There is no abdominal tenderness.   Musculoskeletal:         General: Normal range of motion.      Right lower leg: No edema.      Left lower leg: No edema.   Skin:     General: Skin is warm and dry.   Neurological:      General: No focal deficit present.      Mental Status: She is alert and oriented to person, place, and time.   Psychiatric:         Mood and Affect: Mood normal.         Behavior: Behavior normal.          Assessment/Plan   Problem List Items Addressed This Visit       Chronic obstructive airway disease (Multi) - Primary     no symptoms of acute exacerbation.  staff to monitor and notify for any changes.           Schizophrenia     Managed by psychiatry in the facility         Tobacco use disorder     She continues to smoke.  Previously declined smoing cessation         Vitamin B12 deficiency     On supplement, monitor with routine labs.           Vitamin D deficiency     On supplement, monitor with routine labs.          Weight loss, non-intentional     On supplements, eating fair per staff, she has lost weight, declines any work up for this.  On mirtazipine for appetite stimulation        labs/meds/orders reviewed  staff to monitor and notify for any changes.  continue with supportive and restorative care  She declines labs

## 2024-11-05 NOTE — LETTER
Patient: Lupe Serrato  : 1945    Encounter Date: 2024    Subjective  Lupe Serrato is a 79 y.o. female Here for routine visit.    HPI There are no new problems and multiple health problems have been reviewed.  The course has been unchanged.  The patient  is oriented.  There are no family members present during time of visit.    she is sitting up at side of bed,  denies any complaints when asked.  Eating and drinking fair per staff and she has lost more weight, she declines work up for this including labs. .   No concerns per staff.       Review of Systems   Constitutional:  Negative for chills, fatigue and fever.   Respiratory:  Negative for cough and shortness of breath.    Cardiovascular:  Negative for chest pain and palpitations.   Gastrointestinal:  Negative for abdominal pain, constipation, diarrhea, nausea and vomiting.   Genitourinary:  Negative for difficulty urinating.       Objective  /65   Pulse 75   Temp 36.4 °C (97.6 °F)   Resp 18   Wt (!) 43.1 kg (95 lb)   SpO2 96%   BMI 10.98 kg/m²     Physical Exam  Constitutional:       General: She is not in acute distress.     Comments: thin   HENT:      Head: Normocephalic and atraumatic.   Eyes:      Conjunctiva/sclera: Conjunctivae normal.   Cardiovascular:      Rate and Rhythm: Normal rate and regular rhythm.   Pulmonary:      Effort: Pulmonary effort is normal. No respiratory distress.      Breath sounds: Normal breath sounds.      Comments: Diminished t/o  Abdominal:      General: Bowel sounds are normal. There is no distension.      Palpations: Abdomen is soft.      Tenderness: There is no abdominal tenderness.   Musculoskeletal:         General: Normal range of motion.      Right lower leg: No edema.      Left lower leg: No edema.   Skin:     General: Skin is warm and dry.   Neurological:      General: No focal deficit present.      Mental Status: She is alert and oriented to person, place, and time.   Psychiatric:          Mood and Affect: Mood normal.         Behavior: Behavior normal.         Assessment/Plan  Problem List Items Addressed This Visit       Chronic obstructive airway disease (Multi) - Primary     no symptoms of acute exacerbation.  staff to monitor and notify for any changes.           Schizophrenia     Managed by psychiatry in the facility         Tobacco use disorder     She continues to smoke.  Previously declined smoing cessation         Vitamin B12 deficiency     On supplement, monitor with routine labs.           Vitamin D deficiency     On supplement, monitor with routine labs.          Weight loss, non-intentional     On supplements, eating fair per staff, she has lost weight, declines any work up for this.  On mirtazipine for appetite stimulation        labs/meds/orders reviewed  staff to monitor and notify for any changes.  continue with supportive and restorative care  She declines labs        Electronically Signed By: BETH Victor   11/10/24  7:14 PM

## 2024-11-10 ASSESSMENT — ENCOUNTER SYMPTOMS
CHILLS: 0
NAUSEA: 0
VOMITING: 0
FATIGUE: 0
CONSTIPATION: 0
DIFFICULTY URINATING: 0
COUGH: 0
DIARRHEA: 0
ABDOMINAL PAIN: 0
PALPITATIONS: 0
FEVER: 0
SHORTNESS OF BREATH: 0

## 2024-11-11 NOTE — ASSESSMENT & PLAN NOTE
On supplements, eating fair per staff, she has lost weight, declines any work up for this.  On mirtazipine for appetite stimulation

## 2024-11-21 ENCOUNTER — NURSING HOME VISIT (OUTPATIENT)
Dept: POST ACUTE CARE | Facility: EXTERNAL LOCATION | Age: 79
End: 2024-11-21
Payer: COMMERCIAL

## 2024-11-21 DIAGNOSIS — I10 PRIMARY HYPERTENSION: ICD-10-CM

## 2024-11-21 DIAGNOSIS — J43.8 OTHER EMPHYSEMA (MULTI): ICD-10-CM

## 2024-11-21 DIAGNOSIS — F20.9 SCHIZOPHRENIA, UNSPECIFIED TYPE: ICD-10-CM

## 2024-11-21 DIAGNOSIS — T43.505A NEUROLEPTIC-INDUCED TARDIVE DYSKINESIA: ICD-10-CM

## 2024-11-21 DIAGNOSIS — R63.4 WEIGHT LOSS, NON-INTENTIONAL: Primary | ICD-10-CM

## 2024-11-21 DIAGNOSIS — G24.01 NEUROLEPTIC-INDUCED TARDIVE DYSKINESIA: ICD-10-CM

## 2024-11-21 DIAGNOSIS — R62.7 ADULT FAILURE TO THRIVE: ICD-10-CM

## 2024-11-21 PROCEDURE — 99309 SBSQ NF CARE MODERATE MDM 30: CPT | Performed by: INTERNAL MEDICINE

## 2024-11-21 NOTE — LETTER
Patient: Lupe Serrato  : 1945    Encounter Date: 2024    Subjective  Patient ID: Luep Serrato is a 79 y.o. female who is long term resident being seen and evaluated for multiple medical problems.    HPI   This 79-year-old female patient is resting comfortably in her room in no distress as per her usual status.  She appears to me to be declining in physical function and losing weight.  Nursing has no new adverse events reported to me.    Current high risk medication:  Potassium  Seroquel  Ingrezza    Laboratory examinations ordered for 2024 will likely refused.    Note is made of a 17 pound weight loss since 2024.    Review of Systems   Constitutional:  Negative for chills, fatigue and fever.   Respiratory:  Negative for cough and shortness of breath.    Cardiovascular:  Negative for chest pain and palpitations.   Gastrointestinal:  Negative for abdominal pain, constipation, diarrhea, nausea and vomiting.   Genitourinary:  Negative for difficulty urinating.       Objective  /68   Pulse 70   Resp 20   Wt (!) 43.5 kg (96 lb)   SpO2 97%   BMI 11.09 kg/m²     Physical Exam  Constitutional:       General: She is not in acute distress.     Comments: thin   HENT:      Head: Normocephalic and atraumatic.   Eyes:      Conjunctiva/sclera: Conjunctivae normal.   Cardiovascular:      Rate and Rhythm: Normal rate and regular rhythm.   Pulmonary:      Effort: Pulmonary effort is normal. No respiratory distress.      Breath sounds: Normal breath sounds.      Comments: Diminished t/o  Abdominal:      General: Bowel sounds are normal. There is no distension.      Palpations: Abdomen is soft.      Tenderness: There is no abdominal tenderness.   Musculoskeletal:         General: Normal range of motion.      Right lower leg: No edema.      Left lower leg: No edema.   Skin:     General: Skin is warm and dry.   Neurological:      General: No focal deficit present.      Mental Status: She is  alert and oriented to person, place, and time.   Psychiatric:         Mood and Affect: Mood normal.         Behavior: Behavior normal.         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  We will continue with restorative and supportive care as the patient tolerates    B.  The patient's weight will be followed and if she continues on this trajectory then she would certainly be considered a good candidate for hospice services.  The patient has been on hospice services in the past.    C.  Laboratory examinations will be held at this time as the patient clearly does not want to be intervened upon.    D.  The patient's prognosis is [id number].        Electronically Signed By: Devonte Mcclure MD   12/2/24  5:06 PM

## 2024-11-25 VITALS
DIASTOLIC BLOOD PRESSURE: 68 MMHG | RESPIRATION RATE: 20 BRPM | OXYGEN SATURATION: 97 % | WEIGHT: 96 LBS | SYSTOLIC BLOOD PRESSURE: 126 MMHG | BODY MASS INDEX: 11.09 KG/M2 | HEART RATE: 70 BPM

## 2024-11-25 NOTE — PROGRESS NOTES
Subjective   Patient ID: Lupe Serrato is a 79 y.o. female who is long term resident being seen and evaluated for multiple medical problems.    HPI   This 79-year-old female patient is resting comfortably in her room in no distress as per her usual status.  She appears to me to be declining in physical function and losing weight.  Nursing has no new adverse events reported to me.    Current high risk medication:  Potassium  Seroquel  Ingrezza    Laboratory examinations ordered for October 2024 will likely refused.    Note is made of a 17 pound weight loss since June 5, 2024.    Review of Systems   Constitutional:  Negative for chills, fatigue and fever.   Respiratory:  Negative for cough and shortness of breath.    Cardiovascular:  Negative for chest pain and palpitations.   Gastrointestinal:  Negative for abdominal pain, constipation, diarrhea, nausea and vomiting.   Genitourinary:  Negative for difficulty urinating.       Objective   /68   Pulse 70   Resp 20   Wt (!) 43.5 kg (96 lb)   SpO2 97%   BMI 11.09 kg/m²     Physical Exam  Constitutional:       General: She is not in acute distress.     Comments: thin   HENT:      Head: Normocephalic and atraumatic.   Eyes:      Conjunctiva/sclera: Conjunctivae normal.   Cardiovascular:      Rate and Rhythm: Normal rate and regular rhythm.   Pulmonary:      Effort: Pulmonary effort is normal. No respiratory distress.      Breath sounds: Normal breath sounds.      Comments: Diminished t/o  Abdominal:      General: Bowel sounds are normal. There is no distension.      Palpations: Abdomen is soft.      Tenderness: There is no abdominal tenderness.   Musculoskeletal:         General: Normal range of motion.      Right lower leg: No edema.      Left lower leg: No edema.   Skin:     General: Skin is warm and dry.   Neurological:      General: No focal deficit present.      Mental Status: She is alert and oriented to person, place, and time.   Psychiatric:          Mood and Affect: Mood normal.         Behavior: Behavior normal.         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  We will continue with restorative and supportive care as the patient tolerates    B.  The patient's weight will be followed and if she continues on this trajectory then she would certainly be considered a good candidate for hospice services.  The patient has been on hospice services in the past.    C.  Laboratory examinations will be held at this time as the patient clearly does not want to be intervened upon.    D.  The patient's prognosis is [id number].

## 2024-12-02 ASSESSMENT — ENCOUNTER SYMPTOMS
DIARRHEA: 0
SHORTNESS OF BREATH: 0
COUGH: 0
CONSTIPATION: 0
ABDOMINAL PAIN: 0
DIFFICULTY URINATING: 0
FEVER: 0
VOMITING: 0
PALPITATIONS: 0
CHILLS: 0
NAUSEA: 0
FATIGUE: 0

## 2024-12-20 ENCOUNTER — NURSING HOME VISIT (OUTPATIENT)
Dept: POST ACUTE CARE | Facility: EXTERNAL LOCATION | Age: 79
End: 2024-12-20
Payer: COMMERCIAL

## 2024-12-20 VITALS
TEMPERATURE: 97.3 F | WEIGHT: 99 LBS | HEART RATE: 83 BPM | DIASTOLIC BLOOD PRESSURE: 52 MMHG | RESPIRATION RATE: 18 BRPM | BODY MASS INDEX: 11.44 KG/M2 | OXYGEN SATURATION: 97 % | SYSTOLIC BLOOD PRESSURE: 86 MMHG

## 2024-12-20 DIAGNOSIS — R62.7 ADULT FAILURE TO THRIVE: Primary | ICD-10-CM

## 2024-12-20 DIAGNOSIS — R63.4 WEIGHT LOSS: ICD-10-CM

## 2024-12-20 DIAGNOSIS — J44.9 CHRONIC OBSTRUCTIVE PULMONARY DISEASE, UNSPECIFIED COPD TYPE (MULTI): ICD-10-CM

## 2024-12-20 DIAGNOSIS — F20.89 OTHER SCHIZOPHRENIA: ICD-10-CM

## 2024-12-20 ASSESSMENT — ENCOUNTER SYMPTOMS
FEVER: 0
NAUSEA: 0
ABDOMINAL PAIN: 0
SHORTNESS OF BREATH: 0
CONSTIPATION: 0
DIFFICULTY URINATING: 0
PALPITATIONS: 0
CHILLS: 0
DIARRHEA: 0
VOMITING: 0
FATIGUE: 0
COUGH: 0

## 2024-12-20 NOTE — ASSESSMENT & PLAN NOTE
Weight has increased by 4 lb this month but remains underweight.  She declines any testing or intervention for weight loss.  She is on mirtazipine

## 2024-12-20 NOTE — PROGRESS NOTES
Subjective   Lupe Serrato is a 79 y.o. female Here for routine visit.    HPI There are no new problems and multiple health problems have been reviewed.  The course has been unchanged.  The patient  is oriented.  There are no family members present during time of visit.    she is sitting up at side of bed,  denies any complaints when asked.  Eating and drinking fair per staff, remains underweight, has increased weight by a few lbs this month,  she declines work up for this including labs. .   No concerns per staff.       Review of Systems   Constitutional:  Negative for chills, fatigue and fever.   Respiratory:  Negative for cough and shortness of breath.    Cardiovascular:  Negative for chest pain and palpitations.   Gastrointestinal:  Negative for abdominal pain, constipation, diarrhea, nausea and vomiting.   Genitourinary:  Negative for difficulty urinating.       Objective   BP 86/52   Pulse 83   Temp 36.3 °C (97.3 °F)   Resp 18   Wt (!) 44.9 kg (99 lb)   SpO2 97%   BMI 11.44 kg/m²     Physical Exam  Constitutional:       General: She is not in acute distress.     Comments: thin   HENT:      Head: Normocephalic and atraumatic.   Eyes:      Conjunctiva/sclera: Conjunctivae normal.   Cardiovascular:      Rate and Rhythm: Normal rate and regular rhythm.   Pulmonary:      Effort: Pulmonary effort is normal. No respiratory distress.      Breath sounds: Normal breath sounds.      Comments: Diminished t/o  Abdominal:      General: Bowel sounds are normal. There is no distension.      Palpations: Abdomen is soft.      Tenderness: There is no abdominal tenderness.   Musculoskeletal:         General: Normal range of motion.      Right lower leg: No edema.      Left lower leg: No edema.   Skin:     General: Skin is warm and dry.   Neurological:      General: No focal deficit present.      Mental Status: She is alert and oriented to person, place, and time.   Psychiatric:         Mood and Affect: Mood normal.          Behavior: Behavior normal.         Assessment/Plan   Problem List Items Addressed This Visit       Adult failure to thrive - Primary     She has lost more weight,on mirtazipine for appetite stimulant.  She is being followed by psychiatry          Chronic obstructive airway disease (Multi)     no symptoms of acute exacerbation.  staff to monitor and notify for any changes.           Schizophrenia     Managed by psychiatry in the facility         Weight loss     Weight has increased by 4 lb this month but remains underweight.  She declines any testing or intervention for weight loss.  She is on mirtazipine        labs/meds/orders reviewed  staff to monitor and notify for any changes.  continue with supportive and restorative care  She declines labs

## 2024-12-20 NOTE — LETTER
Patient: Lupe Serrato  : 1945    Encounter Date: 2024    Subjective  Lupe Serrato is a 79 y.o. female Here for routine visit.    HPI There are no new problems and multiple health problems have been reviewed.  The course has been unchanged.  The patient  is oriented.  There are no family members present during time of visit.    she is sitting up at side of bed,  denies any complaints when asked.  Eating and drinking fair per staff, remains underweight, has increased weight by a few lbs this month,  she declines work up for this including labs. .   No concerns per staff.       Review of Systems   Constitutional:  Negative for chills, fatigue and fever.   Respiratory:  Negative for cough and shortness of breath.    Cardiovascular:  Negative for chest pain and palpitations.   Gastrointestinal:  Negative for abdominal pain, constipation, diarrhea, nausea and vomiting.   Genitourinary:  Negative for difficulty urinating.       Objective  BP 86/52   Pulse 83   Temp 36.3 °C (97.3 °F)   Resp 18   Wt (!) 44.9 kg (99 lb)   SpO2 97%   BMI 11.44 kg/m²     Physical Exam  Constitutional:       General: She is not in acute distress.     Comments: thin   HENT:      Head: Normocephalic and atraumatic.   Eyes:      Conjunctiva/sclera: Conjunctivae normal.   Cardiovascular:      Rate and Rhythm: Normal rate and regular rhythm.   Pulmonary:      Effort: Pulmonary effort is normal. No respiratory distress.      Breath sounds: Normal breath sounds.      Comments: Diminished t/o  Abdominal:      General: Bowel sounds are normal. There is no distension.      Palpations: Abdomen is soft.      Tenderness: There is no abdominal tenderness.   Musculoskeletal:         General: Normal range of motion.      Right lower leg: No edema.      Left lower leg: No edema.   Skin:     General: Skin is warm and dry.   Neurological:      General: No focal deficit present.      Mental Status: She is alert and oriented to person,  place, and time.   Psychiatric:         Mood and Affect: Mood normal.         Behavior: Behavior normal.         Assessment/Plan  Problem List Items Addressed This Visit       Adult failure to thrive - Primary     She has lost more weight,on mirtazipine for appetite stimulant.  She is being followed by psychiatry          Chronic obstructive airway disease (Multi)     no symptoms of acute exacerbation.  staff to monitor and notify for any changes.           Schizophrenia     Managed by psychiatry in the facility         Weight loss     Weight has increased by 4 lb this month but remains underweight.  She declines any testing or intervention for weight loss.  She is on mirtazipine        labs/meds/orders reviewed  staff to monitor and notify for any changes.  continue with supportive and restorative care  She declines labs        Electronically Signed By: BETH Victor   12/20/24  3:36 PM

## 2025-01-09 ENCOUNTER — NURSING HOME VISIT (OUTPATIENT)
Dept: POST ACUTE CARE | Facility: EXTERNAL LOCATION | Age: 80
End: 2025-01-09
Payer: COMMERCIAL

## 2025-01-09 VITALS
WEIGHT: 96 LBS | DIASTOLIC BLOOD PRESSURE: 67 MMHG | RESPIRATION RATE: 18 BRPM | HEART RATE: 71 BPM | BODY MASS INDEX: 11.09 KG/M2 | OXYGEN SATURATION: 96 % | SYSTOLIC BLOOD PRESSURE: 136 MMHG

## 2025-01-09 DIAGNOSIS — I10 PRIMARY HYPERTENSION: ICD-10-CM

## 2025-01-09 DIAGNOSIS — F20.9 SCHIZOPHRENIA, UNSPECIFIED TYPE: ICD-10-CM

## 2025-01-09 DIAGNOSIS — T43.505A NEUROLEPTIC-INDUCED TARDIVE DYSKINESIA: ICD-10-CM

## 2025-01-09 DIAGNOSIS — R64 CACHEXIA (MULTI): Primary | ICD-10-CM

## 2025-01-09 DIAGNOSIS — R63.4 WEIGHT LOSS, NON-INTENTIONAL: ICD-10-CM

## 2025-01-09 DIAGNOSIS — R62.7 ADULT FAILURE TO THRIVE: ICD-10-CM

## 2025-01-09 DIAGNOSIS — G24.01 NEUROLEPTIC-INDUCED TARDIVE DYSKINESIA: ICD-10-CM

## 2025-01-09 PROCEDURE — 99308 SBSQ NF CARE LOW MDM 20: CPT | Performed by: INTERNAL MEDICINE

## 2025-01-09 NOTE — LETTER
Patient: Lupe Serrato  : 1945    Encounter Date: 2025    Subjective  Patient ID: Lupe Serrato is a 79 y.o. female who is long term resident being seen and evaluated for multiple medical problems.    HPI   This 79-year-old female patient is resting comfortably in her room in no distress.  She appears to be progressively cachectic and frail.  She denies any complaints of pain or shortness of breath and nursing has no adverse events reported to me.    Current high risk medication:  Potassium  Seroquel  Ingrezza    There are no laboratory examinations on the chart due to patient refusal.    Note is made of a 10.7 pound or 10% weight loss since 2024 with current weight of 95.7 pounds.    Review of Systems   Constitutional:  Negative for chills, fatigue and fever.   Respiratory:  Negative for cough and shortness of breath.    Cardiovascular:  Negative for chest pain and palpitations.   Gastrointestinal:  Negative for abdominal pain, constipation, diarrhea, nausea and vomiting.   Genitourinary:  Negative for difficulty urinating.       Objective  /67   Pulse 71   Resp 18   Wt (!) 43.5 kg (96 lb)   SpO2 96%   BMI 11.09 kg/m²     Physical Exam  Constitutional:       General: She is not in acute distress.     Comments: Cachectic   HENT:      Head: Normocephalic and atraumatic.   Eyes:      Conjunctiva/sclera: Conjunctivae normal.   Cardiovascular:      Rate and Rhythm: Normal rate and regular rhythm.   Pulmonary:      Effort: Pulmonary effort is normal. No respiratory distress.      Breath sounds: Normal breath sounds.      Comments: Diminished t/o  Abdominal:      General: Bowel sounds are normal. There is no distension.      Palpations: Abdomen is soft.      Tenderness: There is no abdominal tenderness.   Musculoskeletal:         General: Normal range of motion.      Right lower leg: No edema.      Left lower leg: No edema.   Skin:     General: Skin is warm and dry.   Neurological:       General: No focal deficit present.      Mental Status: She is alert and oriented to person, place, and time.   Psychiatric:         Mood and Affect: Mood normal.         Behavior: Behavior normal.         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  We will continue with restorative and supportive care as the patient tolerates    B.  The patient has been on hospice in the distant past and would be a good candidate for palliative care and/or hospice at this time should she and her family wish to move in that direction    C.  The patient's prognosis is poor.        Electronically Signed By: Devonte Mcclure MD   1/16/25  3:21 PM

## 2025-01-10 NOTE — PROGRESS NOTES
Subjective   Patient ID: Lupe Serrato is a 79 y.o. female who is long term resident being seen and evaluated for multiple medical problems.    HPI   This 79-year-old female patient is resting comfortably in her room in no distress.  She appears to be progressively cachectic and frail.  She denies any complaints of pain or shortness of breath and nursing has no adverse events reported to me.    Current high risk medication:  Potassium  Seroquel  Ingrezza    There are no laboratory examinations on the chart due to patient refusal.    Note is made of a 10.7 pound or 10% weight loss since July 2, 2024 with current weight of 95.7 pounds.    Review of Systems   Constitutional:  Negative for chills, fatigue and fever.   Respiratory:  Negative for cough and shortness of breath.    Cardiovascular:  Negative for chest pain and palpitations.   Gastrointestinal:  Negative for abdominal pain, constipation, diarrhea, nausea and vomiting.   Genitourinary:  Negative for difficulty urinating.       Objective   /67   Pulse 71   Resp 18   Wt (!) 43.5 kg (96 lb)   SpO2 96%   BMI 11.09 kg/m²     Physical Exam  Constitutional:       General: She is not in acute distress.     Comments: Cachectic   HENT:      Head: Normocephalic and atraumatic.   Eyes:      Conjunctiva/sclera: Conjunctivae normal.   Cardiovascular:      Rate and Rhythm: Normal rate and regular rhythm.   Pulmonary:      Effort: Pulmonary effort is normal. No respiratory distress.      Breath sounds: Normal breath sounds.      Comments: Diminished t/o  Abdominal:      General: Bowel sounds are normal. There is no distension.      Palpations: Abdomen is soft.      Tenderness: There is no abdominal tenderness.   Musculoskeletal:         General: Normal range of motion.      Right lower leg: No edema.      Left lower leg: No edema.   Skin:     General: Skin is warm and dry.   Neurological:      General: No focal deficit present.      Mental Status: She is alert  and oriented to person, place, and time.   Psychiatric:         Mood and Affect: Mood normal.         Behavior: Behavior normal.         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  We will continue with restorative and supportive care as the patient tolerates    B.  The patient has been on hospice in the distant past and would be a good candidate for palliative care and/or hospice at this time should she and her family wish to move in that direction    C.  The patient's prognosis is poor.

## 2025-01-16 PROBLEM — R64 CACHEXIA (MULTI): Status: ACTIVE | Noted: 2025-01-16

## 2025-01-16 ASSESSMENT — ENCOUNTER SYMPTOMS
VOMITING: 0
SHORTNESS OF BREATH: 0
FEVER: 0
ABDOMINAL PAIN: 0
CONSTIPATION: 0
FATIGUE: 0
DIARRHEA: 0
PALPITATIONS: 0
NAUSEA: 0
DIFFICULTY URINATING: 0
COUGH: 0
CHILLS: 0

## 2025-02-13 ENCOUNTER — NURSING HOME VISIT (OUTPATIENT)
Dept: POST ACUTE CARE | Facility: EXTERNAL LOCATION | Age: 80
End: 2025-02-13
Payer: COMMERCIAL

## 2025-02-13 VITALS
WEIGHT: 96 LBS | RESPIRATION RATE: 20 BRPM | HEART RATE: 69 BPM | DIASTOLIC BLOOD PRESSURE: 63 MMHG | SYSTOLIC BLOOD PRESSURE: 145 MMHG | OXYGEN SATURATION: 96 % | BODY MASS INDEX: 11.09 KG/M2

## 2025-02-13 DIAGNOSIS — I47.9 TACHYCARDIA, PAROXYSMAL (MULTI): ICD-10-CM

## 2025-02-13 DIAGNOSIS — R62.7 ADULT FAILURE TO THRIVE: ICD-10-CM

## 2025-02-13 DIAGNOSIS — F20.9 SCHIZOPHRENIA, UNSPECIFIED TYPE: ICD-10-CM

## 2025-02-13 DIAGNOSIS — T43.505A NEUROLEPTIC-INDUCED TARDIVE DYSKINESIA: ICD-10-CM

## 2025-02-13 DIAGNOSIS — J43.9 PULMONARY EMPHYSEMA, UNSPECIFIED EMPHYSEMA TYPE (MULTI): ICD-10-CM

## 2025-02-13 DIAGNOSIS — F17.200 TOBACCO USE DISORDER: ICD-10-CM

## 2025-02-13 DIAGNOSIS — R63.4 WEIGHT LOSS, NON-INTENTIONAL: Primary | ICD-10-CM

## 2025-02-13 DIAGNOSIS — G24.01 NEUROLEPTIC-INDUCED TARDIVE DYSKINESIA: ICD-10-CM

## 2025-02-13 PROCEDURE — 99308 SBSQ NF CARE LOW MDM 20: CPT | Performed by: INTERNAL MEDICINE

## 2025-02-13 NOTE — LETTER
Patient: Lupe Serrato  : 1945    Encounter Date: 2025    Subjective  Patient ID: Lupe Serrato is a 79 y.o. female who is long term resident being seen and evaluated for multiple medical problems.    HPI   This 79-year-old female patient is resting quietly in her room in no distress.  She is outwardly visibly declining from a functional and weight standpoint.  I have asked the patient multiple times regarding whether she would like me to do testing to try and find out why she is declining and she has repeatedly and reliably refused testing.  Nursing has no new adverse events reported to me.    Current high risk medication:  Potassium  Seroquel  Valbenazine    There are no laboratory examinations on the chart for review at this time.    Review of Systems   Constitutional:  Negative for chills, fatigue and fever.   Respiratory:  Negative for cough and shortness of breath.    Cardiovascular:  Negative for chest pain and palpitations.   Gastrointestinal:  Negative for abdominal pain, constipation, diarrhea, nausea and vomiting.   Genitourinary:  Negative for difficulty urinating.       Objective  /63   Pulse 69   Resp 20   Wt (!) 43.5 kg (96 lb)   SpO2 96%   BMI 11.09 kg/m²     Physical Exam  Constitutional:       General: She is not in acute distress.     Comments: Cachectic   HENT:      Head: Normocephalic and atraumatic.   Eyes:      Conjunctiva/sclera: Conjunctivae normal.   Cardiovascular:      Rate and Rhythm: Normal rate and regular rhythm.   Pulmonary:      Effort: Pulmonary effort is normal. No respiratory distress.      Breath sounds: Normal breath sounds.      Comments: Diminished t/o  Abdominal:      General: Bowel sounds are normal. There is no distension.      Palpations: Abdomen is soft.      Tenderness: There is no abdominal tenderness.   Musculoskeletal:         General: Normal range of motion.      Right lower leg: No edema.      Left lower leg: No edema.   Skin:      General: Skin is warm and dry.   Neurological:      General: No focal deficit present.      Mental Status: She is alert and oriented to person, place, and time.   Psychiatric:         Mood and Affect: Mood normal.         Behavior: Behavior normal.         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  We will continue with restorative and supportive care as the patient tolerates    B.  The patient will be considered an excellent candidate for palliative care and/or hospice should she and her family wish to move in that direction.    C.  The patient's prognosis is poor.      Electronically Signed By: Devonte Mcclure MD   2/27/25  2:08 PM

## 2025-02-17 ENCOUNTER — NURSING HOME VISIT (OUTPATIENT)
Dept: POST ACUTE CARE | Facility: EXTERNAL LOCATION | Age: 80
End: 2025-02-17
Payer: COMMERCIAL

## 2025-02-17 VITALS
TEMPERATURE: 97.7 F | HEART RATE: 69 BPM | RESPIRATION RATE: 20 BRPM | WEIGHT: 96 LBS | DIASTOLIC BLOOD PRESSURE: 63 MMHG | SYSTOLIC BLOOD PRESSURE: 145 MMHG | OXYGEN SATURATION: 96 % | BODY MASS INDEX: 11.09 KG/M2

## 2025-02-17 DIAGNOSIS — F20.3 UNDIFFERENTIATED SCHIZOPHRENIA (MULTI): ICD-10-CM

## 2025-02-17 DIAGNOSIS — R64 CACHEXIA (MULTI): ICD-10-CM

## 2025-02-17 DIAGNOSIS — I10 PRIMARY HYPERTENSION: Primary | ICD-10-CM

## 2025-02-17 DIAGNOSIS — J44.9 CHRONIC OBSTRUCTIVE PULMONARY DISEASE, UNSPECIFIED COPD TYPE (MULTI): ICD-10-CM

## 2025-02-17 DIAGNOSIS — F17.200 TOBACCO USE DISORDER: ICD-10-CM

## 2025-02-17 PROCEDURE — 99309 SBSQ NF CARE MODERATE MDM 30: CPT | Performed by: NURSE PRACTITIONER

## 2025-02-17 NOTE — LETTER
Patient: Lupe Serrato  : 1945    Encounter Date: 2025    Subjective  Lupe Serrato is a 79 y.o. female Here for routine visit.    HPI There are no new problems and multiple health problems have been reviewed.  The course has been unchanged.  The patient  is oriented.  There are no family members present during time of visit.    she is resting in bed,  denies any complaints when asked.  Eating and drinking fair per staff, remains underweight but weight has been stable the last few months,  she declines work up for this including labs. .   No concerns per staff.       Review of Systems   Constitutional:  Negative for chills, fatigue and fever.   Respiratory:  Negative for cough and shortness of breath.    Cardiovascular:  Negative for chest pain and palpitations.   Gastrointestinal:  Negative for abdominal pain, constipation, diarrhea, nausea and vomiting.   Genitourinary:  Negative for difficulty urinating.       Objective  /63   Pulse 69   Temp 36.5 °C (97.7 °F)   Resp 20   Wt (!) 43.5 kg (96 lb)   SpO2 96%   BMI 11.09 kg/m²     Physical Exam  Constitutional:       General: She is not in acute distress.     Comments: thin   HENT:      Head: Normocephalic and atraumatic.   Eyes:      Conjunctiva/sclera: Conjunctivae normal.   Cardiovascular:      Rate and Rhythm: Normal rate and regular rhythm.   Pulmonary:      Effort: Pulmonary effort is normal. No respiratory distress.      Breath sounds: Normal breath sounds.      Comments: Diminished t/o  Abdominal:      General: Bowel sounds are normal. There is no distension.      Palpations: Abdomen is soft.      Tenderness: There is no abdominal tenderness.   Musculoskeletal:         General: Normal range of motion.      Right lower leg: No edema.      Left lower leg: No edema.   Skin:     General: Skin is warm and dry.   Neurological:      General: No focal deficit present.      Mental Status: She is alert and oriented to person, place, and  time.   Psychiatric:         Mood and Affect: Mood normal.         Behavior: Behavior normal.         Assessment/Plan  Problem List Items Addressed This Visit       Chronic obstructive airway disease (Multi)     no symptoms of acute exacerbation.  staff to monitor and notify for any changes.           Hypertension - Primary     Not currently on meds, continue to montior and initiate meds  based on clinical course         Schizophrenia     Managed by psychiatry in the facility         Tobacco use disorder     She continues to smoke.  Previously declined smoking cessation         Cachexia (Multi)     She declines any workup for this.  Weight has been overall stable the last few months        labs/meds/orders reviewed  staff to monitor and notify for any changes.  continue with supportive and restorative care  She declines labs        Electronically Signed By: BETH Victor   2/19/25  9:09 PM

## 2025-02-17 NOTE — PROGRESS NOTES
Subjective   Lupe Serrato is a 79 y.o. female Here for routine visit.    HPI There are no new problems and multiple health problems have been reviewed.  The course has been unchanged.  The patient  is oriented.  There are no family members present during time of visit.    she is resting in bed,  denies any complaints when asked.  Eating and drinking fair per staff, remains underweight but weight has been stable the last few months,  she declines work up for this including labs. .   No concerns per staff.       Review of Systems   Constitutional:  Negative for chills, fatigue and fever.   Respiratory:  Negative for cough and shortness of breath.    Cardiovascular:  Negative for chest pain and palpitations.   Gastrointestinal:  Negative for abdominal pain, constipation, diarrhea, nausea and vomiting.   Genitourinary:  Negative for difficulty urinating.       Objective   /63   Pulse 69   Temp 36.5 °C (97.7 °F)   Resp 20   Wt (!) 43.5 kg (96 lb)   SpO2 96%   BMI 11.09 kg/m²     Physical Exam  Constitutional:       General: She is not in acute distress.     Comments: thin   HENT:      Head: Normocephalic and atraumatic.   Eyes:      Conjunctiva/sclera: Conjunctivae normal.   Cardiovascular:      Rate and Rhythm: Normal rate and regular rhythm.   Pulmonary:      Effort: Pulmonary effort is normal. No respiratory distress.      Breath sounds: Normal breath sounds.      Comments: Diminished t/o  Abdominal:      General: Bowel sounds are normal. There is no distension.      Palpations: Abdomen is soft.      Tenderness: There is no abdominal tenderness.   Musculoskeletal:         General: Normal range of motion.      Right lower leg: No edema.      Left lower leg: No edema.   Skin:     General: Skin is warm and dry.   Neurological:      General: No focal deficit present.      Mental Status: She is alert and oriented to person, place, and time.   Psychiatric:         Mood and Affect: Mood normal.          Behavior: Behavior normal.         Assessment/Plan   Problem List Items Addressed This Visit       Chronic obstructive airway disease (Multi)     no symptoms of acute exacerbation.  staff to monitor and notify for any changes.           Hypertension - Primary     Not currently on meds, continue to montior and initiate meds  based on clinical course         Schizophrenia     Managed by psychiatry in the facility         Tobacco use disorder     She continues to smoke.  Previously declined smoking cessation         Cachexia (Multi)     She declines any workup for this.  Weight has been overall stable the last few months        labs/meds/orders reviewed  staff to monitor and notify for any changes.  continue with supportive and restorative care  She declines labs

## 2025-02-19 ASSESSMENT — ENCOUNTER SYMPTOMS
FEVER: 0
CONSTIPATION: 0
COUGH: 0
FATIGUE: 0
DIARRHEA: 0
CHILLS: 0
PALPITATIONS: 0
ABDOMINAL PAIN: 0
DIFFICULTY URINATING: 0
NAUSEA: 0
SHORTNESS OF BREATH: 0
VOMITING: 0

## 2025-02-27 ASSESSMENT — ENCOUNTER SYMPTOMS
DIARRHEA: 0
ABDOMINAL PAIN: 0
CHILLS: 0
CONSTIPATION: 0
FEVER: 0
COUGH: 0
DIFFICULTY URINATING: 0
SHORTNESS OF BREATH: 0
NAUSEA: 0
FATIGUE: 0
VOMITING: 0
PALPITATIONS: 0

## 2025-03-13 ENCOUNTER — NURSING HOME VISIT (OUTPATIENT)
Dept: POST ACUTE CARE | Facility: EXTERNAL LOCATION | Age: 80
End: 2025-03-13
Payer: COMMERCIAL

## 2025-03-13 VITALS
BODY MASS INDEX: 10.86 KG/M2 | WEIGHT: 94 LBS | SYSTOLIC BLOOD PRESSURE: 127 MMHG | DIASTOLIC BLOOD PRESSURE: 65 MMHG | OXYGEN SATURATION: 96 % | RESPIRATION RATE: 16 BRPM | HEART RATE: 68 BPM

## 2025-03-13 DIAGNOSIS — R63.4 WEIGHT LOSS, NON-INTENTIONAL: Primary | ICD-10-CM

## 2025-03-13 DIAGNOSIS — G24.01 NEUROLEPTIC-INDUCED TARDIVE DYSKINESIA: ICD-10-CM

## 2025-03-13 DIAGNOSIS — E46 PROTEIN-CALORIE MALNUTRITION, UNSPECIFIED SEVERITY (MULTI): ICD-10-CM

## 2025-03-13 DIAGNOSIS — F17.200 TOBACCO USE DISORDER: ICD-10-CM

## 2025-03-13 DIAGNOSIS — J43.8 OTHER EMPHYSEMA (MULTI): ICD-10-CM

## 2025-03-13 DIAGNOSIS — F20.9 SCHIZOPHRENIA, UNSPECIFIED TYPE: ICD-10-CM

## 2025-03-13 DIAGNOSIS — R62.7 ADULT FAILURE TO THRIVE: ICD-10-CM

## 2025-03-13 DIAGNOSIS — T43.505A NEUROLEPTIC-INDUCED TARDIVE DYSKINESIA: ICD-10-CM

## 2025-03-13 DIAGNOSIS — I10 PRIMARY HYPERTENSION: ICD-10-CM

## 2025-03-13 DIAGNOSIS — R64 CACHEXIA (MULTI): ICD-10-CM

## 2025-03-13 PROCEDURE — 99309 SBSQ NF CARE MODERATE MDM 30: CPT | Performed by: INTERNAL MEDICINE

## 2025-03-13 NOTE — PROGRESS NOTES
Subjective   Patient ID: Lupe Serrato is a 79 y.o. female who is long term resident being seen and evaluated for multiple medical problems.    HPI   This 79-year-old female patient is resting comfortably in her room in no distress.  She continues to go out to smoke regularly.  Her oral intake is failing per nursing.  The patient has no complaints of pain or shortness of breath and again reiterates her wish not to be intervened upon not to be tested not to be prodded and poked.  This is consistent with her long-term wishes here in the extended-care facility.  He    Current high risk medication:  Seroquel  Ingrezza    Laboratory examinations are refused.    Note is made of a weight of 93.7 pounds, weight in March 2024 was 109 pounds, weight in February 2023 was 135 pounds    Review of Systems   Constitutional:  Positive for fatigue and unexpected weight change. Negative for chills and fever.   Respiratory:  Negative for cough and shortness of breath.    Cardiovascular:  Negative for chest pain and palpitations.   Gastrointestinal:  Negative for abdominal pain, constipation, diarrhea, nausea and vomiting.   Genitourinary:  Negative for difficulty urinating.       Objective   /65   Pulse 68   Resp 16   Wt (!) 42.6 kg (94 lb)   SpO2 96%   BMI 10.86 kg/m²     Physical Exam  Constitutional:       General: She is not in acute distress.     Comments: Cachectic   HENT:      Head: Normocephalic and atraumatic.   Eyes:      Conjunctiva/sclera: Conjunctivae normal.   Cardiovascular:      Rate and Rhythm: Normal rate and regular rhythm.   Pulmonary:      Effort: Pulmonary effort is normal. No respiratory distress.      Breath sounds: Normal breath sounds.      Comments: Diminished t/o  Abdominal:      General: Bowel sounds are normal. There is no distension.      Palpations: Abdomen is soft.      Tenderness: There is no abdominal tenderness.   Musculoskeletal:         General: Normal range of motion.      Right  lower leg: No edema.      Left lower leg: No edema.   Skin:     General: Skin is warm and dry.   Neurological:      General: No focal deficit present.      Mental Status: She is alert and oriented to person, place, and time.   Psychiatric:         Mood and Affect: Mood normal.         Behavior: Behavior normal.         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  We will continue with restorative and supportive care as the patient tolerates    B.  I have asked nursing to contact the patient's guardian and to offer palliative care and/or hospice services for this long-term patient to has consistently asked not to be tested or to receive any medical care whatsoever.  She has lost in excess of 35 pounds in the last 2 years and her prognosis at this stage and with this level of protein calorie malnutrition is poor.    C.  The patient's prognosis is [id number].

## 2025-03-19 ASSESSMENT — ENCOUNTER SYMPTOMS
DIARRHEA: 0
NAUSEA: 0
FATIGUE: 1
VOMITING: 0
ABDOMINAL PAIN: 0
DIFFICULTY URINATING: 0
SHORTNESS OF BREATH: 0
PALPITATIONS: 0
CHILLS: 0
FEVER: 0
UNEXPECTED WEIGHT CHANGE: 1
CONSTIPATION: 0
COUGH: 0

## 2025-04-21 ENCOUNTER — NURSING HOME VISIT (OUTPATIENT)
Dept: POST ACUTE CARE | Facility: EXTERNAL LOCATION | Age: 80
End: 2025-04-21
Payer: COMMERCIAL

## 2025-04-21 VITALS
WEIGHT: 93 LBS | RESPIRATION RATE: 18 BRPM | BODY MASS INDEX: 10.75 KG/M2 | OXYGEN SATURATION: 97 % | SYSTOLIC BLOOD PRESSURE: 121 MMHG | TEMPERATURE: 98.2 F | HEART RATE: 59 BPM | DIASTOLIC BLOOD PRESSURE: 75 MMHG

## 2025-04-21 DIAGNOSIS — F20.3 UNDIFFERENTIATED SCHIZOPHRENIA (MULTI): ICD-10-CM

## 2025-04-21 DIAGNOSIS — R63.4 WEIGHT LOSS: ICD-10-CM

## 2025-04-21 DIAGNOSIS — R64 CACHEXIA (MULTI): ICD-10-CM

## 2025-04-21 DIAGNOSIS — J44.9 CHRONIC OBSTRUCTIVE PULMONARY DISEASE, UNSPECIFIED COPD TYPE (MULTI): Primary | ICD-10-CM

## 2025-04-21 NOTE — PROGRESS NOTES
Subjective   Lupe Serrato is a 79 y.o. female Here for routine visit.    HPI There are no new problems and multiple health problems have been reviewed.  The course has been unchanged.  The patient  is oriented.  There are no family members present during time of visit.    she is resting in bed,  denies any complaints when asked.  Eating and drinking fair per staff, remains underweight, on mirtazipine.    she declines work up for this including labs. .   No concerns per staff.   Brother unable to continue responsibilities for POA, guardianship is in process.     Review of Systems   Constitutional:  Negative for chills, fatigue and fever.   Respiratory:  Negative for cough and shortness of breath.    Cardiovascular:  Negative for chest pain and palpitations.   Gastrointestinal:  Negative for abdominal pain, constipation, diarrhea, nausea and vomiting.   Genitourinary:  Negative for difficulty urinating.       Objective   /75   Pulse 59   Temp 36.8 °C (98.2 °F)   Resp 18   Wt (!) 42.2 kg (93 lb)   SpO2 97%   BMI 10.75 kg/m²     Physical Exam  Constitutional:       General: She is not in acute distress.     Comments: thin   HENT:      Head: Normocephalic and atraumatic.   Eyes:      Conjunctiva/sclera: Conjunctivae normal.   Cardiovascular:      Rate and Rhythm: Normal rate and regular rhythm.   Pulmonary:      Effort: Pulmonary effort is normal. No respiratory distress.      Breath sounds: Normal breath sounds.      Comments: Diminished t/o  Abdominal:      General: Bowel sounds are normal. There is no distension.      Palpations: Abdomen is soft.      Tenderness: There is no abdominal tenderness.   Musculoskeletal:         General: Normal range of motion.      Right lower leg: No edema.      Left lower leg: No edema.   Skin:     General: Skin is warm and dry.   Neurological:      General: No focal deficit present.      Mental Status: She is alert and oriented to person, place, and time.   Psychiatric:          Mood and Affect: Mood normal.         Behavior: Behavior normal.         Assessment/Plan   Problem List Items Addressed This Visit       Chronic obstructive airway disease (Multi) - Primary    no symptoms of acute exacerbation.  staff to monitor and notify for any changes.           Schizophrenia    Managed by psychiatry in the facility, unable to live independently.   Guardianship is in process as her brother is unable to perform POA duties.          Weight loss     She declines any testing or intervention for weight loss.  She is on mirtazipine         Cachexia (Multi)    She declines any workup for this.         labs/meds/orders reviewed  staff to monitor and notify for any changes.  continue with supportive and restorative care  She declines labs

## 2025-04-21 NOTE — LETTER
Patient: Lupe Serrato  : 1945    Encounter Date: 2025    Subjective  Lupe Serrato is a 79 y.o. female Here for routine visit.    HPI There are no new problems and multiple health problems have been reviewed.  The course has been unchanged.  The patient  is oriented.  There are no family members present during time of visit.    she is resting in bed,  denies any complaints when asked.  Eating and drinking fair per staff, remains underweight, on mirtazipine.    she declines work up for this including labs. .   No concerns per staff.   Brother unable to continue responsibilities for POA, guardianship is in process.     Review of Systems   Constitutional:  Negative for chills, fatigue and fever.   Respiratory:  Negative for cough and shortness of breath.    Cardiovascular:  Negative for chest pain and palpitations.   Gastrointestinal:  Negative for abdominal pain, constipation, diarrhea, nausea and vomiting.   Genitourinary:  Negative for difficulty urinating.       Objective  /75   Pulse 59   Temp 36.8 °C (98.2 °F)   Resp 18   Wt (!) 42.2 kg (93 lb)   SpO2 97%   BMI 10.75 kg/m²     Physical Exam  Constitutional:       General: She is not in acute distress.     Comments: thin   HENT:      Head: Normocephalic and atraumatic.   Eyes:      Conjunctiva/sclera: Conjunctivae normal.   Cardiovascular:      Rate and Rhythm: Normal rate and regular rhythm.   Pulmonary:      Effort: Pulmonary effort is normal. No respiratory distress.      Breath sounds: Normal breath sounds.      Comments: Diminished t/o  Abdominal:      General: Bowel sounds are normal. There is no distension.      Palpations: Abdomen is soft.      Tenderness: There is no abdominal tenderness.   Musculoskeletal:         General: Normal range of motion.      Right lower leg: No edema.      Left lower leg: No edema.   Skin:     General: Skin is warm and dry.   Neurological:      General: No focal deficit present.      Mental Status:  She is alert and oriented to person, place, and time.   Psychiatric:         Mood and Affect: Mood normal.         Behavior: Behavior normal.         Assessment/Plan  Problem List Items Addressed This Visit       Chronic obstructive airway disease (Multi) - Primary    no symptoms of acute exacerbation.  staff to monitor and notify for any changes.           Schizophrenia    Managed by psychiatry in the facility, unable to live independently.   Guardianship is in process as her brother is unable to perform POA duties.          Weight loss     She declines any testing or intervention for weight loss.  She is on mirtazipine         Cachexia (Multi)    She declines any workup for this.         labs/meds/orders reviewed  staff to monitor and notify for any changes.  continue with supportive and restorative care  She declines labs        Electronically Signed By: BETH Victor   4/25/25  3:16 PM

## 2025-04-24 ENCOUNTER — NURSING HOME VISIT (OUTPATIENT)
Dept: POST ACUTE CARE | Facility: EXTERNAL LOCATION | Age: 80
End: 2025-04-24
Payer: COMMERCIAL

## 2025-04-24 VITALS
WEIGHT: 93 LBS | OXYGEN SATURATION: 97 % | HEART RATE: 59 BPM | SYSTOLIC BLOOD PRESSURE: 121 MMHG | BODY MASS INDEX: 10.75 KG/M2 | DIASTOLIC BLOOD PRESSURE: 75 MMHG | RESPIRATION RATE: 18 BRPM

## 2025-04-24 DIAGNOSIS — R63.4 WEIGHT LOSS, NON-INTENTIONAL: Primary | ICD-10-CM

## 2025-04-24 DIAGNOSIS — R64 CACHEXIA (MULTI): ICD-10-CM

## 2025-04-24 DIAGNOSIS — T43.505A NEUROLEPTIC-INDUCED TARDIVE DYSKINESIA: ICD-10-CM

## 2025-04-24 DIAGNOSIS — R62.7 ADULT FAILURE TO THRIVE: ICD-10-CM

## 2025-04-24 DIAGNOSIS — E78.5 HYPERLIPIDEMIA, UNSPECIFIED HYPERLIPIDEMIA TYPE: ICD-10-CM

## 2025-04-24 DIAGNOSIS — J43.8 OTHER EMPHYSEMA (MULTI): ICD-10-CM

## 2025-04-24 DIAGNOSIS — F20.9 SCHIZOPHRENIA, UNSPECIFIED TYPE: ICD-10-CM

## 2025-04-24 DIAGNOSIS — I10 PRIMARY HYPERTENSION: ICD-10-CM

## 2025-04-24 DIAGNOSIS — G24.01 NEUROLEPTIC-INDUCED TARDIVE DYSKINESIA: ICD-10-CM

## 2025-04-24 PROCEDURE — 99309 SBSQ NF CARE MODERATE MDM 30: CPT | Performed by: INTERNAL MEDICINE

## 2025-04-24 NOTE — PROGRESS NOTES
Subjective   Patient ID: Lupe Serrato is a 79 y.o. female who is long term resident being seen and evaluated for multiple medical problems.    HPI   79-year-old female patient is resting quietly in her room in no distress.  She is visibly declining in overall clinical condition and physical condition.  She continues to smoke outside.  Her appetite apparently is poor but she is eating pizzas without topping.  Per nursing's been able to maintain her weight during this period she is apparently in the process of acquiring guardianship.    Current high risk medication:  Potassium  Seroquel  Ingrezza    Laboratory examinations have been categorically refused by the patient multiple times    Review of Systems   Constitutional:  Negative for chills, fatigue and fever.   Respiratory:  Negative for cough and shortness of breath.    Cardiovascular:  Negative for chest pain and palpitations.   Gastrointestinal:  Negative for abdominal pain, constipation, diarrhea, nausea and vomiting.   Genitourinary:  Negative for difficulty urinating.       Objective   /75   Pulse 59   Resp 18   Wt (!) 42.2 kg (93 lb)   SpO2 97%   BMI 10.75 kg/m²     Physical Exam  Constitutional:       General: She is not in acute distress.     Comments: Cachectic   HENT:      Head: Normocephalic and atraumatic.   Eyes:      Conjunctiva/sclera: Conjunctivae normal.   Cardiovascular:      Rate and Rhythm: Normal rate and regular rhythm.   Pulmonary:      Effort: Pulmonary effort is normal. No respiratory distress.      Breath sounds: Normal breath sounds.      Comments: Diminished t/o  Abdominal:      General: Bowel sounds are normal. There is no distension.      Palpations: Abdomen is soft.      Tenderness: There is no abdominal tenderness.   Musculoskeletal:         General: Normal range of motion.      Right lower leg: No edema.      Left lower leg: No edema.   Skin:     General: Skin is warm and dry.   Neurological:      General: No focal  deficit present.      Mental Status: She is alert and oriented to person, place, and time.   Psychiatric:         Mood and Affect: Mood normal.         Behavior: Behavior normal.         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  We will continue with restorative and supportive care as the patient tolerance    B.  The patient is cachectic and barely able to maintain her weight eating topping less pizza.  Hopefully guardianship application is in process so that she may enjoy the benefits of hospice services.    C.  The patient's prognosis is [id number].

## 2025-04-24 NOTE — LETTER
Patient: Lupe Serrato  : 1945    Encounter Date: 2025    Subjective  Patient ID: Lupe Serrato is a 79 y.o. female who is long term resident being seen and evaluated for multiple medical problems.    HPI   79-year-old female patient is resting quietly in her room in no distress.  She is visibly declining in overall clinical condition and physical condition.  She continues to smoke outside.  Her appetite apparently is poor but she is eating pizzas without topping.  Per nursing's been able to maintain her weight during this period she is apparently in the process of acquiring guardianship.    Current high risk medication:  Potassium  Seroquel  Ingrezza    Laboratory examinations have been categorically refused by the patient multiple times    Review of Systems   Constitutional:  Negative for chills, fatigue and fever.   Respiratory:  Negative for cough and shortness of breath.    Cardiovascular:  Negative for chest pain and palpitations.   Gastrointestinal:  Negative for abdominal pain, constipation, diarrhea, nausea and vomiting.   Genitourinary:  Negative for difficulty urinating.       Objective  /75   Pulse 59   Resp 18   Wt (!) 42.2 kg (93 lb)   SpO2 97%   BMI 10.75 kg/m²     Physical Exam  Constitutional:       General: She is not in acute distress.     Comments: Cachectic   HENT:      Head: Normocephalic and atraumatic.   Eyes:      Conjunctiva/sclera: Conjunctivae normal.   Cardiovascular:      Rate and Rhythm: Normal rate and regular rhythm.   Pulmonary:      Effort: Pulmonary effort is normal. No respiratory distress.      Breath sounds: Normal breath sounds.      Comments: Diminished t/o  Abdominal:      General: Bowel sounds are normal. There is no distension.      Palpations: Abdomen is soft.      Tenderness: There is no abdominal tenderness.   Musculoskeletal:         General: Normal range of motion.      Right lower leg: No edema.      Left lower leg: No edema.   Skin:      General: Skin is warm and dry.   Neurological:      General: No focal deficit present.      Mental Status: She is alert and oriented to person, place, and time.   Psychiatric:         Mood and Affect: Mood normal.         Behavior: Behavior normal.         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  We will continue with restorative and supportive care as the patient tolerance    B.  The patient is cachectic and barely able to maintain her weight eating topping less pizza.  Hopefully guardianship application is in process so that she may enjoy the benefits of hospice services.    C.  The patient's prognosis is [id number].        Electronically Signed By: Devonte Mcclure MD   4/29/25 11:11 AM

## 2025-04-25 ASSESSMENT — ENCOUNTER SYMPTOMS
DIARRHEA: 0
FEVER: 0
SHORTNESS OF BREATH: 0
CONSTIPATION: 0
VOMITING: 0
COUGH: 0
ABDOMINAL PAIN: 0
NAUSEA: 0
FATIGUE: 0
CHILLS: 0
DIFFICULTY URINATING: 0
PALPITATIONS: 0

## 2025-04-25 NOTE — ASSESSMENT & PLAN NOTE
Managed by psychiatry in the facility, unable to live independently.   Guardianship is in process as her brother is unable to perform POA duties.

## 2025-04-29 ASSESSMENT — ENCOUNTER SYMPTOMS
VOMITING: 0
FEVER: 0
ABDOMINAL PAIN: 0
COUGH: 0
NAUSEA: 0
DIARRHEA: 0
PALPITATIONS: 0
DIFFICULTY URINATING: 0
FATIGUE: 0
SHORTNESS OF BREATH: 0
CONSTIPATION: 0
CHILLS: 0

## 2025-06-03 ENCOUNTER — NURSING HOME VISIT (OUTPATIENT)
Dept: POST ACUTE CARE | Facility: EXTERNAL LOCATION | Age: 80
End: 2025-06-03
Payer: COMMERCIAL

## 2025-06-03 DIAGNOSIS — E78.5 HYPERLIPIDEMIA, UNSPECIFIED HYPERLIPIDEMIA TYPE: Primary | ICD-10-CM

## 2025-06-03 DIAGNOSIS — T43.505A NEUROLEPTIC-INDUCED TARDIVE DYSKINESIA: ICD-10-CM

## 2025-06-03 DIAGNOSIS — R62.7 ADULT FAILURE TO THRIVE: ICD-10-CM

## 2025-06-03 DIAGNOSIS — G24.01 NEUROLEPTIC-INDUCED TARDIVE DYSKINESIA: ICD-10-CM

## 2025-06-03 DIAGNOSIS — E46 PROTEIN-CALORIE MALNUTRITION, UNSPECIFIED SEVERITY (MULTI): ICD-10-CM

## 2025-06-03 DIAGNOSIS — F20.9 SCHIZOPHRENIA, UNSPECIFIED TYPE: ICD-10-CM

## 2025-06-03 DIAGNOSIS — R64 CACHEXIA (MULTI): ICD-10-CM

## 2025-06-03 PROCEDURE — 99305 1ST NF CARE MODERATE MDM 35: CPT | Performed by: STUDENT IN AN ORGANIZED HEALTH CARE EDUCATION/TRAINING PROGRAM

## 2025-06-03 ASSESSMENT — ENCOUNTER SYMPTOMS
FATIGUE: 1
GASTROINTESTINAL NEGATIVE: 1
MUSCULOSKELETAL NEGATIVE: 1
PSYCHIATRIC NEGATIVE: 1
RESPIRATORY NEGATIVE: 1
CARDIOVASCULAR NEGATIVE: 1
WEAKNESS: 1

## 2025-06-03 NOTE — LETTER
Patient: Lupe Serrato  : 1945    Encounter Date: 2025    Subjective  Patient ID: Lupe Serrato is a 79 y.o. female.    Patient is a 79-year-old female with past medical history of schizophrenia, generalized pain, major depression disorder, COPD, iron deficiency anemia who resides at long-term care facility.  Facility has started noticing that patient has been having some issues with oral intake.  On evaluation, patient endorses that she is eating is much as she can, and is much as she wants to.  She does not seem to see this is an issue.  In addition, would also like discontinuation of her sleep medicines that she takes at night.  Otherwise, recent labs reviewed and did show slight hypokalemia.  Otherwise, she has no acute issues or concerns.               Review of Systems   Constitutional:  Positive for fatigue.   HENT: Negative.     Respiratory: Negative.     Cardiovascular: Negative.    Gastrointestinal: Negative.    Musculoskeletal: Negative.    Neurological:  Positive for weakness.   Psychiatric/Behavioral: Negative.         ObjectiveVitals Reviewed via facility EMR   Physical Exam  Constitutional:       General: She is not in acute distress.     Appearance: She is not ill-appearing.   Eyes:      Pupils: Pupils are equal, round, and reactive to light.   Cardiovascular:      Rate and Rhythm: Normal rate and regular rhythm.      Pulses: Normal pulses.      Heart sounds: No murmur heard.  Pulmonary:      Effort: No respiratory distress.      Breath sounds: No wheezing.   Abdominal:      General: Abdomen is flat. Bowel sounds are normal. There is no distension.   Musculoskeletal:      Right lower leg: No edema.      Left lower leg: No edema.   Skin:     General: Skin is warm and dry.   Neurological:      Mental Status: She is alert. Mental status is at baseline.      Cranial Nerves: No cranial nerve deficit.      Motor: Weakness present.   Psychiatric:         Mood and Affect: Mood normal.          Behavior: Behavior normal.         Assessment/Plan  Diagnoses and all orders for this visit:  Hyperlipidemia, unspecified hyperlipidemia type  Adult failure to thrive  Protein-calorie malnutrition, unspecified severity (Multi)  Neuroleptic-induced tardive dyskinesia  Cachexia (Multi)  Schizophrenia, unspecified type  Patient seen and examined at bedside.  Oh overall medically stable however is significantly underweight.  She is on appropriate supplementation.  I do suspect that patient is medication that she wants to discuss continued is Seroquel.  Will reach out to psych for alternatives, however would recommend continuation of this.  Recommend adherence to medication regimen.  Otherwise continue supportive care no additional issues at this time closely monitor weight.  If weight continues to downtrend and patient continues to be resistant to taking medications as well as with care plan, consider hospice evaluation or palliative care evaluation.  No additional issues.     Reviewed and approved by SVEN MCGINNIS on 6/3/25 at 10:47 PM.         Electronically Signed By: Sven Mcginnis DO   6/3/25 10:47 PM

## 2025-06-04 NOTE — PROGRESS NOTES
Subjective   Patient ID: Lupe Serrato is a 79 y.o. female.    Patient is a 79-year-old female with past medical history of schizophrenia, generalized pain, major depression disorder, COPD, iron deficiency anemia who resides at long-term care facility.  Facility has started noticing that patient has been having some issues with oral intake.  On evaluation, patient endorses that she is eating is much as she can, and is much as she wants to.  She does not seem to see this is an issue.  In addition, would also like discontinuation of her sleep medicines that she takes at night.  Otherwise, recent labs reviewed and did show slight hypokalemia.  Otherwise, she has no acute issues or concerns.               Review of Systems   Constitutional:  Positive for fatigue.   HENT: Negative.     Respiratory: Negative.     Cardiovascular: Negative.    Gastrointestinal: Negative.    Musculoskeletal: Negative.    Neurological:  Positive for weakness.   Psychiatric/Behavioral: Negative.         Objective Vitals Reviewed via facility EMR   Physical Exam  Constitutional:       General: She is not in acute distress.     Appearance: She is not ill-appearing.   Eyes:      Pupils: Pupils are equal, round, and reactive to light.   Cardiovascular:      Rate and Rhythm: Normal rate and regular rhythm.      Pulses: Normal pulses.      Heart sounds: No murmur heard.  Pulmonary:      Effort: No respiratory distress.      Breath sounds: No wheezing.   Abdominal:      General: Abdomen is flat. Bowel sounds are normal. There is no distension.   Musculoskeletal:      Right lower leg: No edema.      Left lower leg: No edema.   Skin:     General: Skin is warm and dry.   Neurological:      Mental Status: She is alert. Mental status is at baseline.      Cranial Nerves: No cranial nerve deficit.      Motor: Weakness present.   Psychiatric:         Mood and Affect: Mood normal.         Behavior: Behavior normal.         Assessment/Plan   Diagnoses and  all orders for this visit:  Hyperlipidemia, unspecified hyperlipidemia type  Adult failure to thrive  Protein-calorie malnutrition, unspecified severity (Multi)  Neuroleptic-induced tardive dyskinesia  Cachexia (Multi)  Schizophrenia, unspecified type  Patient seen and examined at bedside.  Oh overall medically stable however is significantly underweight.  She is on appropriate supplementation.  I do suspect that patient is medication that she wants to discuss continued is Seroquel.  Will reach out to psych for alternatives, however would recommend continuation of this.  Recommend adherence to medication regimen.  Otherwise continue supportive care no additional issues at this time closely monitor weight.  If weight continues to downtrend and patient continues to be resistant to taking medications as well as with care plan, consider hospice evaluation or palliative care evaluation.  No additional issues.     Reviewed and approved by LOBO MCGINNIS on 6/3/25 at 10:47 PM.

## 2025-06-18 ENCOUNTER — NURSING HOME VISIT (OUTPATIENT)
Dept: POST ACUTE CARE | Facility: EXTERNAL LOCATION | Age: 80
End: 2025-06-18
Payer: COMMERCIAL

## 2025-06-18 DIAGNOSIS — F20.9 SCHIZOPHRENIA, UNSPECIFIED TYPE: ICD-10-CM

## 2025-06-18 DIAGNOSIS — I10 PRIMARY HYPERTENSION: ICD-10-CM

## 2025-06-18 DIAGNOSIS — E78.5 HYPERLIPIDEMIA, UNSPECIFIED HYPERLIPIDEMIA TYPE: Primary | ICD-10-CM

## 2025-06-18 DIAGNOSIS — E46 PROTEIN-CALORIE MALNUTRITION, UNSPECIFIED SEVERITY (MULTI): ICD-10-CM

## 2025-06-18 DIAGNOSIS — R64 CACHEXIA (MULTI): ICD-10-CM

## 2025-06-18 PROCEDURE — 99309 SBSQ NF CARE MODERATE MDM 30: CPT | Performed by: STUDENT IN AN ORGANIZED HEALTH CARE EDUCATION/TRAINING PROGRAM

## 2025-06-18 NOTE — LETTER
Patient: Lupe Serrato  : 1945    Encounter Date: 2025    Subjective  Patient ID: Lupe Serrato is a 79 y.o. female.    Patient seen and examined on routine evaluation.  Regards that she is overall doing well, and patient has overall been at baseline, however continues to have issues with eating.  Weight has overall been stable, but downtrending.  Otherwise, patient does need an expert eval none no additional questions.         Review of Systems   Constitutional: Negative.    HENT: Negative.     Respiratory: Negative.     Cardiovascular: Negative.    Gastrointestinal: Negative.    Musculoskeletal: Negative.    Neurological: Negative.    Psychiatric/Behavioral: Negative.         ObjectiveVitals Reviewed via facility EMR   Physical Exam  Constitutional:       General: She is not in acute distress.     Appearance: She is not ill-appearing.      Comments: underweight   Eyes:      Pupils: Pupils are equal, round, and reactive to light.   Cardiovascular:      Rate and Rhythm: Normal rate and regular rhythm.      Pulses: Normal pulses.      Heart sounds: No murmur heard.  Pulmonary:      Effort: No respiratory distress.      Breath sounds: No wheezing.   Abdominal:      General: Abdomen is flat. Bowel sounds are normal. There is no distension.   Musculoskeletal:      Right lower leg: No edema.      Left lower leg: No edema.   Skin:     General: Skin is warm and dry.   Neurological:      Mental Status: She is alert. Mental status is at baseline.      Cranial Nerves: No cranial nerve deficit.      Motor: Weakness present.      Comments: Cannot make choices for oneself   Psychiatric:         Mood and Affect: Mood normal.         Behavior: Behavior normal.         Assessment/Plan  Diagnoses and all orders for this visit:  Hyperlipidemia, unspecified hyperlipidemia type  Primary hypertension  Protein-calorie malnutrition, unspecified severity (Multi)  Schizophrenia, unspecified type  Cachexia (Multi)  Patient  seen and examined at bedside.  Overall medically stable.  I was able to review documentation from previous expiratory evaluation at an on face-to-face evaluation.I recently took over this patient from another provider, however would continue guardianship.  Patient will warrant continuation of her guardianship.  Would recommend continued guardianship for 1 more year.  Otherwise no additional issues at this time, discussed with staff.   Reviewed and approved by SVEN MCGINNIS on 6/19/25 at 9:19 PM.         Electronically Signed By: Sven Mcginnis DO   6/19/25  9:19 PM

## 2025-06-19 ASSESSMENT — ENCOUNTER SYMPTOMS
CARDIOVASCULAR NEGATIVE: 1
GASTROINTESTINAL NEGATIVE: 1
RESPIRATORY NEGATIVE: 1
CONSTITUTIONAL NEGATIVE: 1
PSYCHIATRIC NEGATIVE: 1
NEUROLOGICAL NEGATIVE: 1
MUSCULOSKELETAL NEGATIVE: 1

## 2025-06-20 NOTE — PROGRESS NOTES
Subjective   Patient ID: Lupe Serrato is a 79 y.o. female.    Patient seen and examined on routine evaluation.  Regards that she is overall doing well, and patient has overall been at baseline, however continues to have issues with eating.  Weight has overall been stable, but downtrending.  Otherwise, patient does need an expert eval none no additional questions.         Review of Systems   Constitutional: Negative.    HENT: Negative.     Respiratory: Negative.     Cardiovascular: Negative.    Gastrointestinal: Negative.    Musculoskeletal: Negative.    Neurological: Negative.    Psychiatric/Behavioral: Negative.         Objective Vitals Reviewed via facility EMR   Physical Exam  Constitutional:       General: She is not in acute distress.     Appearance: She is not ill-appearing.      Comments: underweight   Eyes:      Pupils: Pupils are equal, round, and reactive to light.   Cardiovascular:      Rate and Rhythm: Normal rate and regular rhythm.      Pulses: Normal pulses.      Heart sounds: No murmur heard.  Pulmonary:      Effort: No respiratory distress.      Breath sounds: No wheezing.   Abdominal:      General: Abdomen is flat. Bowel sounds are normal. There is no distension.   Musculoskeletal:      Right lower leg: No edema.      Left lower leg: No edema.   Skin:     General: Skin is warm and dry.   Neurological:      Mental Status: She is alert. Mental status is at baseline.      Cranial Nerves: No cranial nerve deficit.      Motor: Weakness present.      Comments: Cannot make choices for oneself   Psychiatric:         Mood and Affect: Mood normal.         Behavior: Behavior normal.         Assessment/Plan   Diagnoses and all orders for this visit:  Hyperlipidemia, unspecified hyperlipidemia type  Primary hypertension  Protein-calorie malnutrition, unspecified severity (Multi)  Schizophrenia, unspecified type  Cachexia (Multi)  Patient seen and examined at bedside.  Overall medically stable.  I was able to  review documentation from previous expiratory evaluation at an on face-to-face evaluation.I recently took over this patient from another provider, however would continue guardianship.  Patient will warrant continuation of her guardianship.  Would recommend continued guardianship for 1 more year.  Otherwise no additional issues at this time, discussed with staff.   Reviewed and approved by LOBO MCGINNIS on 6/19/25 at 9:19 PM.

## 2025-07-17 ENCOUNTER — NURSING HOME VISIT (OUTPATIENT)
Dept: POST ACUTE CARE | Facility: EXTERNAL LOCATION | Age: 80
End: 2025-07-17
Payer: COMMERCIAL

## 2025-07-17 DIAGNOSIS — R62.7 ADULT FAILURE TO THRIVE: ICD-10-CM

## 2025-07-17 DIAGNOSIS — E46 PROTEIN-CALORIE MALNUTRITION, UNSPECIFIED SEVERITY (MULTI): ICD-10-CM

## 2025-07-17 DIAGNOSIS — R64 CACHEXIA (MULTI): ICD-10-CM

## 2025-07-17 DIAGNOSIS — F69 BEHAVIOR PROBLEM, ADULT: ICD-10-CM

## 2025-07-17 DIAGNOSIS — I10 PRIMARY HYPERTENSION: Primary | ICD-10-CM

## 2025-07-17 DIAGNOSIS — F20.9 SCHIZOPHRENIA, UNSPECIFIED TYPE: ICD-10-CM

## 2025-07-18 ASSESSMENT — ENCOUNTER SYMPTOMS
MUSCULOSKELETAL NEGATIVE: 1
CARDIOVASCULAR NEGATIVE: 1
WEAKNESS: 1
RESPIRATORY NEGATIVE: 1
GASTROINTESTINAL NEGATIVE: 1
FATIGUE: 1
PSYCHIATRIC NEGATIVE: 1

## 2025-07-18 NOTE — PROGRESS NOTES
Subjective   Patient ID: Lupe Serrato is a 79 y.o. female.    Patient seen and examined on routine evaluation.  She regards that she is overall doing well without any acute issues or concerns.  Her weight unfortunately, continues to downtrend.  She states that she does have an appetite.  Otherwise, mood and behaviors have overall been stable.  Denies any additional issues or concerns.         Review of Systems   Constitutional:  Positive for fatigue.   HENT: Negative.     Respiratory: Negative.     Cardiovascular: Negative.    Gastrointestinal: Negative.    Musculoskeletal: Negative.    Neurological:  Positive for weakness.   Psychiatric/Behavioral: Negative.         Objective Vitals Reviewed via facility EMR   Physical Exam  Constitutional:       General: She is not in acute distress.     Appearance: She is ill-appearing.      Comments: Weight loss, dec weight     Eyes:      Pupils: Pupils are equal, round, and reactive to light.       Cardiovascular:      Rate and Rhythm: Normal rate and regular rhythm.      Pulses: Normal pulses.      Heart sounds: No murmur heard.  Pulmonary:      Effort: No respiratory distress.      Breath sounds: No wheezing.   Abdominal:      General: Abdomen is flat. Bowel sounds are normal. There is no distension.     Musculoskeletal:      Right lower leg: No edema.      Left lower leg: No edema.     Skin:     General: Skin is warm and dry.     Neurological:      Mental Status: She is alert. Mental status is at baseline.      Cranial Nerves: No cranial nerve deficit.      Motor: Weakness present.     Psychiatric:         Mood and Affect: Mood normal.         Behavior: Behavior normal.         Assessment/Plan   Diagnoses and all orders for this visit:  Primary hypertension  Protein-calorie malnutrition, unspecified severity (Multi)  Adult failure to thrive  Schizophrenia, unspecified type  Behavior problem, adult  Cachexia (Multi)      Patient seen and examined at bedside.  Overall  medically stable, however, continues to have downtrending urine.  Due to this, we will uptitrated Remeron to 30 mg closely monitor side effect profile.  Consider olanzapine as last resort or could consider PEG tube if patient agreeable for weight gain, will discuss with staff.  However would also want psych input with regards to this if we are to do antipsychotic or PEG as patient may be poor candidate.  Otherwise, no other changing medications.  Continue supportive care.     Reviewed and approved by LOBO MCGINNIS on 7/18/25 at 3:22 PM.

## 2025-08-14 ENCOUNTER — NURSING HOME VISIT (OUTPATIENT)
Dept: POST ACUTE CARE | Facility: EXTERNAL LOCATION | Age: 80
End: 2025-08-14
Payer: COMMERCIAL

## 2025-08-14 DIAGNOSIS — F20.9 SCHIZOPHRENIA, UNSPECIFIED TYPE: ICD-10-CM

## 2025-08-14 DIAGNOSIS — F69 BEHAVIOR PROBLEM, ADULT: ICD-10-CM

## 2025-08-14 DIAGNOSIS — E78.5 HYPERLIPIDEMIA, UNSPECIFIED HYPERLIPIDEMIA TYPE: Primary | ICD-10-CM

## 2025-08-14 DIAGNOSIS — R62.7 ADULT FAILURE TO THRIVE: ICD-10-CM

## 2025-08-14 DIAGNOSIS — Z51.5 HOSPICE CARE PATIENT: ICD-10-CM

## 2025-08-14 DIAGNOSIS — I10 PRIMARY HYPERTENSION: ICD-10-CM

## 2025-08-14 PROCEDURE — 99308 SBSQ NF CARE LOW MDM 20: CPT | Performed by: STUDENT IN AN ORGANIZED HEALTH CARE EDUCATION/TRAINING PROGRAM

## 2025-08-14 ASSESSMENT — ENCOUNTER SYMPTOMS
WEAKNESS: 1
RESPIRATORY NEGATIVE: 1
GASTROINTESTINAL NEGATIVE: 1
PSYCHIATRIC NEGATIVE: 1
CARDIOVASCULAR NEGATIVE: 1
MUSCULOSKELETAL NEGATIVE: 1
FATIGUE: 1